# Patient Record
Sex: MALE | Race: WHITE | NOT HISPANIC OR LATINO | Employment: FULL TIME | URBAN - METROPOLITAN AREA
[De-identification: names, ages, dates, MRNs, and addresses within clinical notes are randomized per-mention and may not be internally consistent; named-entity substitution may affect disease eponyms.]

---

## 2021-02-04 ENCOUNTER — OCCUPATIONAL HEALTH (OUTPATIENT)
Dept: URGENT CARE | Facility: CLINIC | Age: 21
End: 2021-02-04

## 2021-02-04 DIAGNOSIS — Z02.89 ENCOUNTER FOR EXAMINATION REQUIRED BY DEPARTMENT OF TRANSPORTATION (DOT): Primary | ICD-10-CM

## 2021-02-04 PROCEDURE — 99499 UNLISTED E&M SERVICE: CPT | Mod: S$GLB,,, | Performed by: NURSE PRACTITIONER

## 2021-02-04 PROCEDURE — 80306 DRUG TEST PRSMV INSTRMNT: CPT | Mod: S$GLB,,, | Performed by: NURSE PRACTITIONER

## 2021-02-04 PROCEDURE — 99499 PHYSICAL, RECERT DOT/CDL: ICD-10-PCS | Mod: S$GLB,,, | Performed by: NURSE PRACTITIONER

## 2021-02-04 PROCEDURE — 80306 OOH DOT DRUG SCREEN: ICD-10-PCS | Mod: S$GLB,,, | Performed by: NURSE PRACTITIONER

## 2022-02-14 ENCOUNTER — OCCUPATIONAL HEALTH (OUTPATIENT)
Dept: URGENT CARE | Facility: CLINIC | Age: 22
End: 2022-02-14
Payer: COMMERCIAL

## 2022-02-14 DIAGNOSIS — Z02.89 ENCOUNTER FOR EXAMINATION REQUIRED BY DEPARTMENT OF TRANSPORTATION (DOT): ICD-10-CM

## 2022-02-14 DIAGNOSIS — Z00.00 ENCOUNTER FOR PHYSICAL EXAMINATION: Primary | ICD-10-CM

## 2022-02-14 PROCEDURE — 99499 DOT PHYSICAL: ICD-10-PCS | Mod: S$GLB,,, | Performed by: PREVENTIVE MEDICINE

## 2022-02-14 PROCEDURE — 99499 UNLISTED E&M SERVICE: CPT | Mod: S$GLB,,, | Performed by: PREVENTIVE MEDICINE

## 2022-02-14 PROCEDURE — 99080 OSHA QUESTIONNAIRE: ICD-10-PCS | Mod: S$GLB,,, | Performed by: PREVENTIVE MEDICINE

## 2022-02-14 PROCEDURE — 80306 OOH DOT DRUG SCREEN: ICD-10-PCS | Mod: S$GLB,,, | Performed by: PREVENTIVE MEDICINE

## 2022-02-14 PROCEDURE — 99080 SPECIAL REPORTS OR FORMS: CPT | Mod: S$GLB,,, | Performed by: PREVENTIVE MEDICINE

## 2022-02-14 PROCEDURE — 80306 DRUG TEST PRSMV INSTRMNT: CPT | Mod: S$GLB,,, | Performed by: PREVENTIVE MEDICINE

## 2022-02-14 PROCEDURE — 94010 PULMONARY FUNCTION SCREENING (OCC MED PHYSICALS): ICD-10-PCS | Mod: S$GLB,,, | Performed by: PREVENTIVE MEDICINE

## 2022-02-14 PROCEDURE — 94010 BREATHING CAPACITY TEST: CPT | Mod: S$GLB,,, | Performed by: PREVENTIVE MEDICINE

## 2022-02-14 PROCEDURE — 99002 MASK FIT-QUANTITATIVE: ICD-10-PCS | Mod: S$GLB,,, | Performed by: PREVENTIVE MEDICINE

## 2022-02-14 PROCEDURE — 99002 DEVICE HANDLING PHYS/QHP: CPT | Mod: S$GLB,,, | Performed by: PREVENTIVE MEDICINE

## 2022-02-20 ENCOUNTER — HOSPITAL ENCOUNTER (EMERGENCY)
Facility: HOSPITAL | Age: 22
Discharge: HOME | End: 2022-02-20
Attending: EMERGENCY MEDICINE
Payer: COMMERCIAL

## 2022-02-20 VITALS
DIASTOLIC BLOOD PRESSURE: 71 MMHG | BODY MASS INDEX: 23.06 KG/M2 | SYSTOLIC BLOOD PRESSURE: 133 MMHG | WEIGHT: 170 LBS | OXYGEN SATURATION: 98 % | HEART RATE: 95 BPM | TEMPERATURE: 99 F | RESPIRATION RATE: 16 BRPM

## 2022-02-20 DIAGNOSIS — R06.02 SHORTNESS OF BREATH: ICD-10-CM

## 2022-02-20 DIAGNOSIS — R07.9 CHEST PAIN: ICD-10-CM

## 2022-02-20 LAB
ALBUMIN SERPL BCP-MCNC: 4.6 G/DL (ref 3.5–5.2)
ALP SERPL-CCNC: 53 U/L (ref 55–135)
ALT SERPL W/O P-5'-P-CCNC: 28 U/L (ref 10–44)
ANION GAP SERPL CALC-SCNC: 12 MMOL/L (ref 8–16)
AST SERPL-CCNC: 22 U/L (ref 10–40)
BASOPHILS # BLD AUTO: 0.08 K/UL (ref 0–0.2)
BASOPHILS NFR BLD: 0.9 % (ref 0–1.9)
BILIRUB SERPL-MCNC: 0.5 MG/DL (ref 0.1–1)
BUN SERPL-MCNC: 13 MG/DL (ref 6–20)
CALCIUM SERPL-MCNC: 9.9 MG/DL (ref 8.7–10.5)
CHLORIDE SERPL-SCNC: 101 MMOL/L (ref 95–110)
CO2 SERPL-SCNC: 25 MMOL/L (ref 23–29)
CREAT SERPL-MCNC: 1 MG/DL (ref 0.5–1.4)
DIFFERENTIAL METHOD: ABNORMAL
EOSINOPHIL # BLD AUTO: 0.1 K/UL (ref 0–0.5)
EOSINOPHIL NFR BLD: 1.3 % (ref 0–8)
ERYTHROCYTE [DISTWIDTH] IN BLOOD BY AUTOMATED COUNT: 12 % (ref 11.5–14.5)
EST. GFR  (AFRICAN AMERICAN): >60 ML/MIN/1.73 M^2
EST. GFR  (NON AFRICAN AMERICAN): >60 ML/MIN/1.73 M^2
GLUCOSE SERPL-MCNC: 108 MG/DL (ref 70–110)
HCT VFR BLD AUTO: 47.1 % (ref 40–54)
HGB BLD-MCNC: 16 G/DL (ref 14–18)
IMM GRANULOCYTES # BLD AUTO: 0.03 K/UL (ref 0–0.04)
IMM GRANULOCYTES NFR BLD AUTO: 0.3 % (ref 0–0.5)
LYMPHOCYTES # BLD AUTO: 1.5 K/UL (ref 1–4.8)
LYMPHOCYTES NFR BLD: 15.9 % (ref 18–48)
MCH RBC QN AUTO: 31.9 PG (ref 27–31)
MCHC RBC AUTO-ENTMCNC: 34 G/DL (ref 32–36)
MCV RBC AUTO: 94 FL (ref 82–98)
MONOCYTES # BLD AUTO: 0.9 K/UL (ref 0.3–1)
MONOCYTES NFR BLD: 9.4 % (ref 4–15)
NEUTROPHILS # BLD AUTO: 6.8 K/UL (ref 1.8–7.7)
NEUTROPHILS NFR BLD: 72.2 % (ref 38–73)
NRBC BLD-RTO: 0 /100 WBC
PLATELET # BLD AUTO: 283 K/UL (ref 150–450)
PMV BLD AUTO: 10.7 FL (ref 9.2–12.9)
POTASSIUM SERPL-SCNC: 3.9 MMOL/L (ref 3.5–5.1)
PROT SERPL-MCNC: 7.5 G/DL (ref 6–8.4)
RBC # BLD AUTO: 5.02 M/UL (ref 4.6–6.2)
SODIUM SERPL-SCNC: 138 MMOL/L (ref 136–145)
TSH SERPL DL<=0.005 MIU/L-ACNC: 1.91 UIU/ML (ref 0.4–4)
WBC # BLD AUTO: 9.39 K/UL (ref 3.9–12.7)

## 2022-02-20 PROCEDURE — 99285 EMERGENCY DEPT VISIT HI MDM: CPT | Mod: 25

## 2022-02-20 PROCEDURE — 93005 ELECTROCARDIOGRAM TRACING: CPT

## 2022-02-20 PROCEDURE — 80053 COMPREHEN METABOLIC PANEL: CPT | Performed by: EMERGENCY MEDICINE

## 2022-02-20 PROCEDURE — 99285 PR EMERGENCY DEPT VISIT,LEVEL V: ICD-10-PCS | Mod: ,,, | Performed by: EMERGENCY MEDICINE

## 2022-02-20 PROCEDURE — 99285 EMERGENCY DEPT VISIT HI MDM: CPT | Mod: ,,, | Performed by: EMERGENCY MEDICINE

## 2022-02-20 PROCEDURE — 87389 HIV-1 AG W/HIV-1&-2 AB AG IA: CPT | Performed by: EMERGENCY MEDICINE

## 2022-02-20 PROCEDURE — 84443 ASSAY THYROID STIM HORMONE: CPT | Performed by: EMERGENCY MEDICINE

## 2022-02-20 PROCEDURE — 93010 ELECTROCARDIOGRAM REPORT: CPT | Mod: ,,, | Performed by: INTERNAL MEDICINE

## 2022-02-20 PROCEDURE — 86803 HEPATITIS C AB TEST: CPT | Performed by: EMERGENCY MEDICINE

## 2022-02-20 PROCEDURE — 25000003 PHARM REV CODE 250: Performed by: EMERGENCY MEDICINE

## 2022-02-20 PROCEDURE — 85025 COMPLETE CBC W/AUTO DIFF WBC: CPT | Performed by: EMERGENCY MEDICINE

## 2022-02-20 PROCEDURE — 93010 EKG 12-LEAD: ICD-10-PCS | Mod: ,,, | Performed by: INTERNAL MEDICINE

## 2022-02-20 RX ORDER — HYDROXYZINE PAMOATE 25 MG/1
25 CAPSULE ORAL
Status: COMPLETED | OUTPATIENT
Start: 2022-02-20 | End: 2022-02-20

## 2022-02-20 RX ORDER — HYDROXYZINE HYDROCHLORIDE 25 MG/1
25 TABLET, FILM COATED ORAL 3 TIMES DAILY PRN
Qty: 12 TABLET | Refills: 0 | Status: SHIPPED | OUTPATIENT
Start: 2022-02-20 | End: 2022-07-06 | Stop reason: ALTCHOICE

## 2022-02-20 RX ADMIN — HYDROXYZINE PAMOATE 25 MG: 25 CAPSULE ORAL at 10:02

## 2022-02-21 NOTE — ED TRIAGE NOTES
"Pt arrives to ED with complaint of midsternal, nonradiating chest "fullness" and sob. States symptoms started 1 month ago, but worsened tonight, states difficulty taking deep breath in. Denies n/v  "

## 2022-02-21 NOTE — ED PROVIDER NOTES
"Encounter Date: 2/20/2022    SCRIBE #1 NOTE: I, Deepti Becky, am scribing for, and in the presence of,  Birttani Solano MD. I have scribed the following portions of the note - Other sections scribed: HPI, ROS, PE.       History     Chief Complaint   Patient presents with    Chest Pain     Pt c/o chest heaviness and SOB that have been persistent for the past few months, but has gotten worse tonight. Took a couple puffs of his father's inhaler to see if it would help with no relief.      HPI      Time patient was seen by the provider: 8:35 PM    The patient is a 21 y.o. male with no significant medical history who presents to the ED with a complaint of chest pain. Patient reports that his pain has been on going on for a couple of months but has worsened tonight. He describes it as, "discomforting and fullness," to the chest. He notes a constant feeling of needing to stretch and just feels off. Patient adds that when he inhales and exhales it is hard for him to initiate the next breath. Patient reports that when he sleeps he sometimes has to catch this breath in his sleep but denies the feeling of sleep apnea. Does not sleep next to anyone. He reports that he works at [employer] but does not come into contact with the chemicals used at this job. His  believes that he could be having panics attacks but is unsure. Patient notes being uncomfortable and weakness to the entire body when this happens. He denies diet changes, fatigue, or LOC when he experiences his episodes. He was seen at an Urgent care couple months ago due to his concerns but his exams came back normal. Patient also denies drinking caffeine or energy drinks. He adds that he has stopped using tobacco x 1 month and social ETOH usage.      The history was provided by Patient and , medical records.        Review of patient's allergies indicates:  No Known Allergies  History reviewed. No pertinent past medical history.  Past Surgical " "History:   Procedure Laterality Date    ORIF FOREARM FRACTURE Right      History reviewed. No pertinent family history.  Social History     Tobacco Use    Smoking status: Current Some Day Smoker     Types: Vaping with nicotine    Smokeless tobacco: Never Used    Tobacco comment: quit a "few days ago."   Substance Use Topics    Alcohol use: Yes     Comment: social    Drug use: Yes     Types: Marijuana     Comment: occasional     Review of Systems  General: No fever.  No chills.  Eyes: No visual changes.  Head: No headache.    Integument: No rashes or lesions.  Chest: Shortness of breath.  Cardiovascular: No chest pain.  Abdomen: No abdominal pain.  No nausea or vomiting.  Urinary: No abnormal urination.  Neurologic: No focal weakness.  No numbness.  Hematologic: No easy bruising.  Endocrine: No excessive thirst or urination.    Physical Exam     Initial Vitals [02/20/22 2016]   BP Pulse Resp Temp SpO2   (!) 171/91 (!) 123 18 98.6 °F (37 °C) 98 %      MAP       --         Physical Exam  Appearance: No acute distress.  HEENT: Normocephalic. Atraumatic. No conjunctival injection. EOMI. PERRL.    Neck: No JVD. Neck supple.    Chest: Non-tender. No respiratory distress. Lungs CTAB  Cardiovascular: Tachycardic. Regular rhythm. +2 radial pulses bilaterally. No LE edema  Abdomen: Not distended   Musculoskeletal: Good range of motion all joints. No deformities.    Neurologic: Alert and oriented x 3.  Equal strength in upper and lower extremities bilaterally. Normal sensation. No facial droop. Normal speech.    Psych:  Appropriate, conversant. Mildly anxious   Integumentary: No rashes seen.  Good turgor.  No abrasions.  No ecchymoses.    ED Course   Procedures  Labs Reviewed   CBC W/ AUTO DIFFERENTIAL - Abnormal; Notable for the following components:       Result Value    MCH 31.9 (*)     Lymph % 15.9 (*)     All other components within normal limits    Narrative:     Release to patient->Immediate   COMPREHENSIVE " METABOLIC PANEL - Abnormal; Notable for the following components:    Alkaline Phosphatase 53 (*)     All other components within normal limits    Narrative:     Release to patient->Immediate   TSH    Narrative:     Release to patient->Immediate   HIV 1 / 2 ANTIBODY   HEPATITIS C ANTIBODY        ECG Results          EKG 12-lead (In process)  Result time 02/20/22 20:48:47    In process by Interface, Lab In [facility] (02/20/22 20:48:47)                 Narrative:    Test Reason : R07.9,    Vent. Rate : 121 BPM     Atrial Rate : 121 BPM     P-R Int : 124 ms          QRS Dur : 076 ms      QT Int : 288 ms       P-R-T Axes : 080 048 023 degrees     QTc Int : 408 ms    Sinus tachycardia  Possible Left atrial enlargement  Borderline Abnormal ECG  When compared with ECG of 09-NOV-2021 10:16,  No significant change was found    Referred By: [email or web address]   SELF           Confirmed By:                             Imaging Results          X-Ray Chest PA And Lateral (Final result)  Result time 02/20/22 21:11:40    Final result by Luis E Li MD (02/20/22 21:11:40)                 Impression:      No obvious evidence of an acute pulmonary process.      Electronically signed by: Luis E Li  Date:    02/20/2022  Time:    21:11             Narrative:    EXAMINATION:  XR CHEST PA AND LATERAL    CLINICAL HISTORY:  Shortness of breath    TECHNIQUE:  PA and lateral views of the chest were performed.    COMPARISON:  November 9, 2021    FINDINGS:  Multiple views of the chest reveals that the lungs are well aerated.  No indication of a consolidative process or pleural effusion.  No pulmonary nodules.  The heart is normal in size and contour.  The lateral projection is unremarkable.                                 Medications   hydrOXYzine pamoate capsule 25 mg (25 mg Oral Given 2/20/22 2241)     Medical Decision Making:   History:   Old Medical Records: I decided to obtain old medical records.  Independently Interpreted Test(s):   I  have ordered and independently interpreted X-rays - see prior notes.  I have ordered and independently interpreted EKG Reading(s) - see prior notes  Clinical Tests:   Lab Tests: Ordered and Reviewed  Radiological Study: Ordered and Reviewed  Medical Tests: Ordered and Reviewed          Scribe Attestation:   Scribe #1: I performed the above scribed service and the documentation accurately describes the services I performed. I attest to the accuracy of the note.                 Clinical Impression:   Final diagnoses:  [R07.9] Chest pain  [R06.02] Shortness of breath         20 yo male presents for difficulty breathing. On arrival, pt tachycardic, hypertensive, otherwise VSS< afeb. Pt visibly anxious on exam, otherwise benign. No e/o CHF or VTE. Lab reassuring. EKG sinus tachy, normal intervals, no STEMI. CXR without acute abn. Reassessed, pt well-appearing, NAD, nontoxic.     Discussed results, diagnosis, and treatment plan with pt; advised close follow-up with PCP. Reviewed strict return precautions. Pt confirms understanding and ability to comply.           ED Disposition Condition    Discharge Stable        ED Prescriptions     Medication Sig Dispense Start Date End Date Auth. Provider    hydrOXYzine HCL (ATARAX) 25 MG tablet Take 1 tablet (25 mg total) by mouth 3 (three) times daily as needed for Anxiety. 12 tablet 2/20/2022  Brittani Sloano MD        Follow-up Information     Follow up With Specialties Details Why Contact Info Additional Information    [facility] Internal Medicine Schedule an appointment as soon as possible for a visit   [address]  [facility] [address] [phone] [facility] Please park in surface lot and check in at central registration desk           Brittani Solano MD  02/20/22 0052

## 2022-02-22 LAB
HCV AB SERPL QL IA: NEGATIVE
HIV 1+2 AB+HIV1 P24 AG SERPL QL IA: NEGATIVE

## 2022-07-06 ENCOUNTER — OFFICE VISIT (OUTPATIENT)
Dept: INTERNAL MEDICINE | Facility: CLINIC | Age: 22
End: 2022-07-06
Payer: COMMERCIAL

## 2022-07-06 VITALS
HEART RATE: 84 BPM | SYSTOLIC BLOOD PRESSURE: 135 MMHG | HEIGHT: 72 IN | DIASTOLIC BLOOD PRESSURE: 75 MMHG | WEIGHT: 207.44 LBS | BODY MASS INDEX: 28.1 KG/M2 | OXYGEN SATURATION: 98 %

## 2022-07-06 DIAGNOSIS — F41.1 GAD (GENERALIZED ANXIETY DISORDER): ICD-10-CM

## 2022-07-06 DIAGNOSIS — F90.9 ATTENTION DEFICIT HYPERACTIVITY DISORDER (ADHD), UNSPECIFIED ADHD TYPE: ICD-10-CM

## 2022-07-06 DIAGNOSIS — Z76.89 ENCOUNTER TO ESTABLISH CARE WITH NEW DOCTOR: Primary | ICD-10-CM

## 2022-07-06 DIAGNOSIS — Z72.0 NICOTINE USE: ICD-10-CM

## 2022-07-06 PROCEDURE — 99204 OFFICE O/P NEW MOD 45 MIN: CPT | Mod: S$GLB,,, | Performed by: INTERNAL MEDICINE

## 2022-07-06 PROCEDURE — 99204 PR OFFICE/OUTPT VISIT, NEW, LEVL IV, 45-59 MIN: ICD-10-PCS | Mod: S$GLB,,, | Performed by: INTERNAL MEDICINE

## 2022-07-06 PROCEDURE — 99999 PR PBB SHADOW E&M-EST. PATIENT-LVL IV: ICD-10-PCS | Mod: PBBFAC,,, | Performed by: INTERNAL MEDICINE

## 2022-07-06 PROCEDURE — 99999 PR PBB SHADOW E&M-EST. PATIENT-LVL IV: CPT | Mod: PBBFAC,,, | Performed by: INTERNAL MEDICINE

## 2022-07-06 RX ORDER — ESCITALOPRAM OXALATE 10 MG/1
TABLET ORAL
Qty: 30 TABLET | Refills: 1 | Status: SHIPPED | OUTPATIENT
Start: 2022-07-06 | End: 2022-08-18 | Stop reason: SDUPTHER

## 2022-07-06 NOTE — PROGRESS NOTES
INTERNAL MEDICINE INITIAL VISIT NOTE      CHIEF COMPLAINT     Chief Complaint   Patient presents with    Anxiety    Breathing Problem       HPI     Michael Diaz is a 21 y.o. male with ADHD, here today to establish care. No PCP listed.     Diagnosed in elementary school with ADHD - not on medication presently.     Reports multiple ER visits for chest pain and shortness of breath since fall 2021. Thought to be anxiety related. Prescribed hydroxyzine PRN. Helpful as it aided in sleep.     Episodes of severe anxiety often start while driving - has to pull over because feels like about to pass out. Begins with shallow breathing. Also occurs prior to falling asleep - gasps for breath, eventual improvement. Overall has increased in frequency over past 1 year, now almost daily. Not bothersome when occupied at work or resting at home.     Past Medical History:  Past Medical History:   Diagnosis Date    ADHD (attention deficit hyperactivity disorder)        Review of Systems:  Review of Systems   Constitutional: Negative for chills and fever.   HENT: Negative for congestion.    Respiratory: Negative for cough and shortness of breath.    Cardiovascular: Negative for chest pain.   Gastrointestinal: Negative for constipation, nausea and vomiting.   Genitourinary: Negative for hematuria and urgency.   Musculoskeletal: Negative for falls.   Skin: Negative for rash.   Neurological: Negative for dizziness and loss of consciousness.   Psychiatric/Behavioral: The patient is nervous/anxious.        Health Maintenance:   Immunizations:   Influenza - fall 2022  Tdap - next visit  Covid 19 - next visit  HPV - complete  Prevnar rec at 65  Shingrix rec at 50    Cancer Screening:  PAP: n/a  Mammogram:  n/a  Colonoscopy:  n/a  DEXA:  n/a      PHYSICAL EXAM     /75 (BP Location: Right arm, Patient Position: Sitting, BP Method: Medium (Manual))   Pulse 84   Ht 6' (1.829 m)   Wt 94.1 kg (207 lb 7.3 oz)   SpO2 98%   BMI 28.14 kg/m²      GEN - A+OX4, NAD   HEENT - PERRL, EOMI,   Neck - No thyromegaly or thyroid masses felt.  No cervical lymphadenopathy appreciated.  CV - RRR, no m/r/g  Chest - CTAB, no wheezing, crackles, or rhonchi  Abd - S/NT/ND/+BS.   Ext - 2+BDP. No C/C/E.  Skin - Normal color and texture, no rash, no skin lesions.    ASSESSMENT/PLAN     Michael Diaz is a 21 y.o. male with conditions as above.     Encounter to establish care with new doctor  Prior notes/labs/imaging reviewed including recent ER visits    Attention deficit hyperactivity disorder (ADHD), unspecified ADHD type  Stable, off medications    KLAUDIA (generalized anxiety disorder)  Discussed behavioral therapy, pharmacotherapy; counseled on proper sleep hygiene, increasing physical activity, mindfulness/yoga practices  Start low dose Lexapro and refer for counseling; advised on possible side effects  Provided with educational handouts  -     EScitalopram oxalate (LEXAPRO) 10 MG tablet; Take 5mg (1/2 tablet) by mouth daily. If tolerated, increase to 10mg (1 tablet) by mouth daily thereafter.  Dispense: 30 tablet; Refill: 1  -     Ambulatory referral/consult to Primary Care Behavioral Health (Non-Opioids); Future; Expected date: 07/13/2022    Nicotine use  Contemplative    HM as above    RTC in 6 weeks, sooner if needed and depending on labs.    Jo Ann Cooper MD  Department of Internal Medicine - [facility]  07/06/2022

## 2022-07-08 ENCOUNTER — PATIENT MESSAGE (OUTPATIENT)
Dept: BEHAVIORAL HEALTH | Facility: CLINIC | Age: 22
End: 2022-07-08
Payer: COMMERCIAL

## 2022-07-08 ENCOUNTER — TELEPHONE (OUTPATIENT)
Dept: BEHAVIORAL HEALTH | Facility: CLINIC | Age: 22
End: 2022-07-08
Payer: COMMERCIAL

## 2022-07-25 ENCOUNTER — TELEPHONE (OUTPATIENT)
Dept: BEHAVIORAL HEALTH | Facility: CLINIC | Age: 22
End: 2022-07-25
Payer: COMMERCIAL

## 2022-07-26 NOTE — PROGRESS NOTES
Patient was referred to the [facility] Non Opioid program by PCP.  CHW tired to reach out to patient a total of three times.  Patient referral was removed from the [facility] program.  CHW sent follow up message to patient's PCP via [employer].

## 2022-08-18 ENCOUNTER — OFFICE VISIT (OUTPATIENT)
Dept: INTERNAL MEDICINE | Facility: CLINIC | Age: 22
End: 2022-08-18
Payer: COMMERCIAL

## 2022-08-18 VITALS
BODY MASS INDEX: 28.7 KG/M2 | DIASTOLIC BLOOD PRESSURE: 78 MMHG | SYSTOLIC BLOOD PRESSURE: 140 MMHG | OXYGEN SATURATION: 97 % | WEIGHT: 211.88 LBS | HEIGHT: 72 IN | HEART RATE: 93 BPM

## 2022-08-18 DIAGNOSIS — R03.0 ELEVATED BLOOD PRESSURE READING IN OFFICE WITHOUT DIAGNOSIS OF HYPERTENSION: ICD-10-CM

## 2022-08-18 DIAGNOSIS — F41.1 GAD (GENERALIZED ANXIETY DISORDER): Primary | ICD-10-CM

## 2022-08-18 DIAGNOSIS — G47.9 SLEEPING DIFFICULTIES: ICD-10-CM

## 2022-08-18 PROCEDURE — 99999 PR PBB SHADOW E&M-EST. PATIENT-LVL IV: CPT | Mod: PBBFAC,,, | Performed by: INTERNAL MEDICINE

## 2022-08-18 PROCEDURE — 99999 PR PBB SHADOW E&M-EST. PATIENT-LVL IV: ICD-10-PCS | Mod: PBBFAC,,, | Performed by: INTERNAL MEDICINE

## 2022-08-18 PROCEDURE — 99214 PR OFFICE/OUTPT VISIT, EST, LEVL IV, 30-39 MIN: ICD-10-PCS | Mod: S$GLB,,, | Performed by: INTERNAL MEDICINE

## 2022-08-18 PROCEDURE — 99214 OFFICE O/P EST MOD 30 MIN: CPT | Mod: S$GLB,,, | Performed by: INTERNAL MEDICINE

## 2022-08-18 RX ORDER — ESCITALOPRAM OXALATE 10 MG/1
10 TABLET ORAL DAILY
Qty: 30 TABLET | Refills: 11 | Status: SHIPPED | OUTPATIENT
Start: 2022-08-18 | End: 2022-11-11 | Stop reason: SDUPTHER

## 2022-08-18 RX ORDER — HYDROXYZINE HYDROCHLORIDE 10 MG/1
10 TABLET, FILM COATED ORAL NIGHTLY PRN
Qty: 30 TABLET | Refills: 5 | Status: ON HOLD | OUTPATIENT
Start: 2022-08-18 | End: 2023-08-02 | Stop reason: HOSPADM

## 2022-08-18 NOTE — PROGRESS NOTES
INTERNAL MEDICINE ESTABLISHED PATIENT VISIT NOTE    Subjective:     Chief Complaint: Follow-up       Patient ID: Michael Diaz is a 22 y.o. male with ADHD, KLAUDIA, last seen by me 7/2022, here today for follow-up.    Started on Lexapro and referred to Behavioral Health for management of anxiety at initial visit. Unaware of attempts by Behavioral Health to contact pt. Only 1 panic attack since starting Lexapro. Overall, anxiety more controlled. Continuing to have issues with sleeping; feels short of breath suddenly while trying to sleep or asleep and then wakes up. Does occasionally nap during day.     Past Medical History:  Past Medical History:   Diagnosis Date    ADHD (attention deficit hyperactivity disorder)           Review of Systems:  Review of Systems   Constitutional: Negative for chills and fever.   HENT: Negative for congestion.    Respiratory: Negative for cough and shortness of breath.    Cardiovascular: Negative for chest pain.   Gastrointestinal: Negative for constipation, nausea and vomiting.   Genitourinary: Negative for hematuria and urgency.   Musculoskeletal: Negative for falls.   Skin: Negative for rash.   Neurological: Negative for dizziness and loss of consciousness.       Health Maintenance:   Immunizations:   Influenza - fall 2022  Tdap - next visit  Covid 19 - next visit  HPV - complete  Prevnar rec at 65  Shingrix rec at 50     Cancer Screening:  PAP: n/a  Mammogram:  n/a  Colonoscopy:  n/a  DEXA:  n/a    Objective:   BP (!) 140/78 (BP Location: Left arm, Patient Position: Sitting)   Pulse 93   Ht 6' (1.829 m)   Wt 96.1 kg (211 lb 13.8 oz)   SpO2 97%   BMI 28.73 kg/m²        Labs:       Assessment/Plan     KLAUDIA (generalized anxiety disorder)  Improving, continue Lexapro 10; refer to Behavioral Health again as requested, pt aware may be contacted via phone/portal message   -     EScitalopram oxalate (LEXAPRO) 10 MG tablet; Take 1 tablet (10 mg total) by mouth once daily.  Dispense: 30  tablet; Refill: 11  -     Ambulatory referral/consult to Primary Care Behavioral Health (Non-Opioids); Future; Expected date: 08/25/2022    Sleeping difficulties  Multifactorial etiology, likely mood disorder and sleep hygiene mediated  Advised on appropriate sleep hygiene measures; trial of low dose Atarax  To notify clinic of continued/worsening sleep issues   -     hydrOXYzine HCL (ATARAX) 10 MG Tab; Take 1 tablet (10 mg total) by mouth nightly as needed (insomnia).  Dispense: 30 tablet; Refill: 5    Elevated blood pressure reading in office without diagnosis of hypertension  Advised to purchase BP cuff and keep log of daily BP readings; to send log via portal  Low Na diet, physical activity    HM as above    RTC in 4 mo, sooner if needed.    Jo Ann Cooper MD  Department of Internal Medicine - [facility]  08/20/2022

## 2022-08-18 NOTE — PATIENT INSTRUCTIONS
Purchase BP cuff and check BP at least 3 times a week at various times of day/evening. Send BP log via portal in 3-4 weeks.

## 2022-08-23 ENCOUNTER — TELEPHONE (OUTPATIENT)
Dept: BEHAVIORAL HEALTH | Facility: CLINIC | Age: 22
End: 2022-08-23
Payer: COMMERCIAL

## 2022-08-23 NOTE — PROGRESS NOTES
Behavioral Health Community Health Worker  Initial Assessment  Completed by:  Landry Wheat    Date:  8/23/2022    Patient Enrollment in Behavioral Health Program:  · Patient verbalized understanding of Behavioral Health Integration services to include:  · Patient understands that CHW, LCSW, PharmD and consulting Psychiatrist are members of the care team working collaboratively with his/her primary care provider: Yes  · Patient understands that activation of their [facility] patient portal account is required for accessing the full scope of team services: Yes  · Patient understands that some counseling sessions may occur via video: Yes  · Clinic visits with the psychiatrist may be subject to a co-pay based on your insurance: Yes  · Patient consents to enroll in BHI program: Yes    Assessments     Single Item Health Literacy Scale:  · How often do you need to have someone help you read instructions, pamphlets or other written material from your doctor or pharmacy?: Never    Promis 10:  · Promis 10 Responses  · In general, would you say your health is: Very good  · In general, would you say your quality of life is: Very good  · In general, how would you rate your physical health?: Very good  · In general, how would you rate your mental health, including your mood and your ability to think?: Good  · In general, how would you rate your satisfaction with your social activities and relationships?: Very good  · In general, please rate how well you carry out your usual social activities and roles. (This includes activities at home, at work and in your community, and responsibilities as a parent, child, spouse, employee, friend, etc.): Excellent  · To what extent are you able to carry out your everyday physical activities such as walking, climbing stairs, carrying groceries, or moving a chair? : Completely  · How often have you been bothered by emotional problems such as feeling anxious, depressed or irritable?:  "Sometimes  · In the past 7 days, how would you rate your fatigue on average?: Moderate  · In the past 7 days, on a scale of 0 to 10 (where 0 is no pain and 10 is the worst pain imaginable) how would you rate your pain on average?: 0  · Global Physical Health: 12  · Global Mental health Score: 14    Depression PHQ:  PHQ9 8/23/2022   Total Score 3         Generalized Anxiety Disorder 7-Item Scale:  GAD7 8/23/2022   1. Feeling nervous, anxious, or on edge? 3   2. Not being able to stop or control worrying? 0   3. Worrying too much about different things? 0   4. Trouble relaxing? 0   5. Being so restless that it is hard to sit still? 2   6. Becoming easily annoyed or irritable? 0   7. Feeling afraid as if something awful might happen? 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        Call Summary     Patient was referred to the BHI (Non-opioid) program by Primary Care Provider, Dr. Cooper CHW contacted Michael Diaz   Patient scored "3" on the PHQ9 and "5" on the KLAUDIA 7. Based on these scores patient is eligible for the Behavioral health Integration (Non-opioid) Program. CHW completed the intake and scheduled an appointment for patient with Gisele Michel LCSW, on 9/20/22       "

## 2022-10-05 NOTE — PROGRESS NOTES
Primary Care Behavioral Health Integration: Initial  Date:  10/13/2022  Referral Source:  Isaiah Cooper MD  Type of Visit:  In person  Length of Appointment: 45  .  History of Present Illness:  Michael Diaz, a 22 y.o. male referred by Isaiah Cooper MD.  Patient was seen, examined and chart was reviewed.    Met with patient.   Michael Diaz is a 21 y.o. male with ADHD, here today to establish care. No PCP listed.          About a year and a half ago, he noticed trouble breathing that was really bad while driving and trouble sleeping because of it.  He had a couple of panic attacks.  He went to the ED for one. He started to realize he might have anxiety.  Pt's pcp put him on lexapro.  It has helped with anxiety while driving.  He has trouble falling asleep.  He will start feeling out of breath.  He will be falling asleep and will jolt awake when falling asleep because he can't breathe.  He has panic attacks twice a week. He reports he tries not to focus on the panic attack.  He doesn't like to focus on his breathing because it makes him more anxious.  He tries to distract self.  He finds touching his neck helps.  Stretching his back helps.  He doesn't get light headed anymore.  He feels lexapro is helping a good bit.  He isn't sure if he wants to increase his lexapro unless it helps with sleep more.    He will watch [employer] on his phone before sleep.  Takes 2-3 hours to go to sleep.  He tries to get 6-7 hours of sleep a night.    Was in [employer] for a little bit. 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   Doesn't know if family hx.   Pt has really good friends.  He is into the EDM scene. Lots of supportive friends.  Recreation: music, video games, likes food, raves  Relax: youtube, netflix,   He reports he worries a lot but is getting better at that.  He will remove himself from the situation if he needs to.  Worry is focused on breathing and heart rate.   He  gets anxious falling asleep because of the difficulty breathing.    We discussed coming up with a mantra for sleep.  SW will send sleep meditations.       Current symptoms:  Depression: denies.  Anxiety: excessive worrying, restlessness, muscle tension, and panic attacks.  Sleep: difficulty falling asleep. Takes 2-3 hours to fall asleep. Gets 6-7 hours of sleep a night.   Ellyn:  denies.  Psychosis: denies .    PHQ9 8/23/2022   Total Score 3     GAD7 8/23/2022   1. Feeling nervous, anxious, or on edge? 3   2. Not being able to stop or control worrying? 0   3. Worrying too much about different things? 0   4. Trouble relaxing? 0   5. Being so restless that it is hard to sit still? 2   6. Becoming easily annoyed or irritable? 0   7. Feeling afraid as if something awful might happen? 0   KLAUDIA-7 Score 5       Risk assessment:  Patient reports no suicidal ideation  Patient reports no homicidal ideation  Patient reports no self-injurious behavior  Patient reports no violent behavior    Past Medical History:   Diagnosis Date    ADHD (attention deficit hyperactivity disorder)          Current Outpatient Medications:     EScitalopram oxalate (LEXAPRO) 10 MG tablet, Take 1 tablet (10 mg total) by mouth once daily., Disp: 30 tablet, Rfl: 11    hydrOXYzine HCL (ATARAX) 10 MG Tab, Take 1 tablet (10 mg total) by mouth nightly as needed (insomnia)., Disp: 30 tablet, Rfl: 5    Psychiatric History:  Psychiatric Diagnosis:  Pt is taking  hydroxyzine (Vistrail) 10mg qHS for Anxiety. Pt is taking escitalopram (Lexapro) 10mg once daily for Depression.   Rarely takes hydroxyzine.   They are not interested in medication changes.  Previous Medication Trials: Yes unsure of what he took for adhd  Previous Psychiatric Outpatient Treatment:  yes when a child for adhd   Previous Psychiatric Hospitalizations:  no  Previous Suicide Attempts:  no  History of Trauma:  no  Access to a Firearm:  no    Substance Use History:  Tobacco/Nicotine:  Yes -    vapes 1/2 a cartridge a day  Alcohol:     moderately 3 times a week. 5 white claws. It helps with sleep.   Illicit Substances:  used to smoke mj but makes him anxious so he stopped  Misuse of Prescription Medications:  denies  Caffeine: no it makes him more anxious     Mental Status Exam  General Appearance:  unremarkable, age appropriate   Speech: normal tone, normal rate, normal pitch, normal volume      Level of Cooperation: cooperative      Thought Processes: normal and logical   Mood: steady      Thought Content: normal, no suicidality, no homicidality, delusions, or paranoia   Affect: congruent and appropriate   Orientation: Oriented x3   Memory: recent >  intact, remote >  intact   Attention Span & Concentration: Not assessed   Fund of General Knowledge: Not assessed   Abstract Reasoning: Not assessed   Judgment & Insight: good     Language  intact       Impression:   My diagnostic impression is Anxiety disorders; generalized anxiety disorder [F41.1], as evidenced by .excessive worrying, restlessness, muscle tension, and panic attacks     Provisional Diagnosis:  1. KLAUDIA (generalized anxiety disorder)         Treatment Goals and Plan: Initial appointment focused on gathering history, identifying treatment goals and developing a treatment plan.      Anxiety: reducing negative automatic thoughts, reducing physical symptoms of anxiety, and reducing time spent worrying (<30 minutes/day)    Future treatment will utilize CBT, Mindfulness Techniques, Motivational Interviewing, Solution-focused Therapy, and Relaxation Techniques .      Return to Clinic: Follow up in about 3 weeks (around 10/27/2022).

## 2022-10-06 ENCOUNTER — OFFICE VISIT (OUTPATIENT)
Dept: BEHAVIORAL HEALTH | Facility: CLINIC | Age: 22
End: 2022-10-06
Payer: COMMERCIAL

## 2022-10-06 ENCOUNTER — PATIENT MESSAGE (OUTPATIENT)
Dept: BEHAVIORAL HEALTH | Facility: CLINIC | Age: 22
End: 2022-10-06
Payer: COMMERCIAL

## 2022-10-06 DIAGNOSIS — F41.1 GAD (GENERALIZED ANXIETY DISORDER): ICD-10-CM

## 2022-10-06 PROCEDURE — 99999 PR PBB SHADOW E&M-EST. PATIENT-LVL I: ICD-10-PCS | Mod: PBBFAC,,, | Performed by: SOCIAL WORKER

## 2022-10-06 PROCEDURE — 90791 PSYCH DIAGNOSTIC EVALUATION: CPT | Mod: S$GLB,,, | Performed by: SOCIAL WORKER

## 2022-10-06 PROCEDURE — 99999 PR PBB SHADOW E&M-EST. PATIENT-LVL I: CPT | Mod: PBBFAC,,, | Performed by: SOCIAL WORKER

## 2022-10-06 PROCEDURE — 90791 PR PSYCHIATRIC DIAGNOSTIC EVALUATION: ICD-10-PCS | Mod: S$GLB,,, | Performed by: SOCIAL WORKER

## 2022-10-11 ENCOUNTER — PATIENT MESSAGE (OUTPATIENT)
Dept: BEHAVIORAL HEALTH | Facility: CLINIC | Age: 22
End: 2022-10-11
Payer: COMMERCIAL

## 2022-11-07 ENCOUNTER — TELEPHONE (OUTPATIENT)
Dept: BEHAVIORAL HEALTH | Facility: CLINIC | Age: 22
End: 2022-11-07
Payer: COMMERCIAL

## 2022-11-10 ENCOUNTER — PATIENT MESSAGE (OUTPATIENT)
Dept: BEHAVIORAL HEALTH | Facility: CLINIC | Age: 22
End: 2022-11-10
Payer: COMMERCIAL

## 2022-11-10 ENCOUNTER — OFFICE VISIT (OUTPATIENT)
Dept: BEHAVIORAL HEALTH | Facility: CLINIC | Age: 22
End: 2022-11-10
Payer: COMMERCIAL

## 2022-11-10 DIAGNOSIS — F41.1 GAD (GENERALIZED ANXIETY DISORDER): Primary | ICD-10-CM

## 2022-11-10 PROCEDURE — 90834 PSYTX W PT 45 MINUTES: CPT | Mod: S$GLB,,, | Performed by: SOCIAL WORKER

## 2022-11-10 PROCEDURE — 90834 PR PSYCHOTHERAPY W/PATIENT, 45 MIN: ICD-10-PCS | Mod: S$GLB,,, | Performed by: SOCIAL WORKER

## 2022-11-10 NOTE — PROGRESS NOTES
"Individual Psychotherapy (LCSW/PhD)  Michael Diaz,  11/10/2022    Site:  [facility]         Therapeutic Intervention: Met with patient for individual psychotherapy.    Chief complaint/reason for encounter: anxiety     Interval history and content of current session: Pt feels anxiety is getting moderately better.  "It's less of an intrusion."  He feels most days are pretty even keeled.  He shares continued problems with sleep.  He shares he tried mantras and sleep meditations but they didn't work.  He is having trouble falling asleep and feels he can't breathe.  He will be trying to fall asleep but will gasp as he's falling asleep.  It doesn't cause addrenaline rush it used to cause.  It happens multiple times before he falls asleep.  He wakes feeling rested.  He denies snoring.  He isn't worrying as much about breathing during the day.  He hasn't had a panic attack in a week or two.  He found the sleep meditations made him sleepy but didn't help him fall asleep.  HE tried the meditations for 2 weeks but then stopped because it wasn't working.  His going to bed routine is watching youtube before bed.  He will sometimes fall asleep while watching youtube.  He falls asleep better with noise in the background.  IT usually takes about an hour to fall asleep. The difficulty falling asleep started when he was getting anxious.    He is looking into seeing a chiropractor to help with back and see if this helps with his bnreathing.  Not taking in caffeine.    He wants to get on a diet and start exercising.    Discussed sleep hygiene. Discussed sleep podcasts.    Discussed different sleep mantras.  He has been telling himself adriana as a mantra.  He likes doing the mantras and finds it helpful. Discussed saying "I'll sleep fine"  or "i'll fall asleep fine" as he's falling asleep as a mantra.  He is having more vivid dreams.   He is interested in increasing lexapro.  JADON will staff with Dr. Edwards      Treatment " plan:  Target symptoms: anxiety   Why chosen therapy is appropriate versus another modality: relevant to diagnosis, patient responds to this modality  Outcome monitoring methods: self-report, observation  Therapeutic intervention type: behavior modifying psychotherapy, supportive psychotherapy    Risk parameters:  Patient reports no suicidal ideation  Patient reports no homicidal ideation  Patient reports no self-injurious behavior  Patient reports no violent behavior    Verbal deficits: None    Patient's response to intervention:  The patient's response to intervention is accepting.    Progress toward goals and other mental status changes:  The patient's progress toward goals is good.    Diagnosis:     ICD-10-CM ICD-9-CM   1. KLAUDIA (generalized anxiety disorder)  F41.1 300.02       Plan: Pt plans to continue individual psychotherapy    Return to clinic: 3 weeks    Length of Service (minutes): 45

## 2022-11-10 NOTE — Clinical Note
Please see my addendum from today's I meeting.  If you agree with this plan, I will pend medication order in a separate refill encounter.  Please reach out to me with any questions.  Sahhnaz Edwards

## 2022-11-11 DIAGNOSIS — F41.1 GAD (GENERALIZED ANXIETY DISORDER): ICD-10-CM

## 2022-11-11 RX ORDER — ESCITALOPRAM OXALATE 20 MG/1
20 TABLET ORAL DAILY
Qty: 30 TABLET | Refills: 2 | Status: ON HOLD | OUTPATIENT
Start: 2022-11-11 | End: 2023-08-02 | Stop reason: HOSPADM

## 2022-11-11 NOTE — PROGRESS NOTES
Patient discussed during I meeting today.  Patient with poorly controlled anxiety.  He is taking Lexapro 10mg daily which is helping some.  He is interested in increasing dose to 20mg daily.  I will pend order for PCP if she agrees with plan.      Time: 8 minutes

## 2022-11-16 ENCOUNTER — PATIENT MESSAGE (OUTPATIENT)
Dept: BEHAVIORAL HEALTH | Facility: CLINIC | Age: 22
End: 2022-11-16
Payer: COMMERCIAL

## 2022-11-22 ENCOUNTER — TELEPHONE (OUTPATIENT)
Dept: BEHAVIORAL HEALTH | Facility: CLINIC | Age: 22
End: 2022-11-22
Payer: COMMERCIAL

## 2022-11-22 NOTE — PROGRESS NOTES
Behavioral Health Community Health Worker  Follow-Up  Completed by:  Landry Wheat    Date:  11/22/2022    Patient Enrollment in Behavioral Health Program:  Michael Diaz was enrolled in the Behavioral Health Program on 10/6/22    Assessments     Promis 10:  PROMIS-10 Questionnaire Scores 8/23/2022   Global Physical Health 12   Global Mental health Score 14       Depression PHQ:  PHQ9 11/22/2022   Total Score 9       Generalized Anxiety Disorder 7-Item Scale:  GAD7 11/22/2022   1. Feeling nervous, anxious, or on edge? 3   2. Not being able to stop or control worrying? 2   3. Worrying too much about different things? 0   4. Trouble relaxing? 2   5. Being so restless that it is hard to sit still? 1   6. Becoming easily annoyed or irritable? 0   7. Feeling afraid as if something awful might happen? 2   8. If you checked off any problems, how difficult have these problems made it for you to do your work, take care of things at home, or get along with other people? 1   KLAUDIA-7 Score 10       Patients' Global Impression of Change (PGIC) Scale:  Since beginning treatment at this clinic, how would you describe the change (if any) in ACTIVITY LIMITATIONS, SYMPTOMS, EMOTIONS, and OVERALL QUALITY OF LIFE, related to your painful condition?  No Value exists for the : OHS#53392      In a similar way, please check the number below that matches your degree of change since beginning care at this clinic (Much better (0) - Much Worse (10)): No Value exists for the : OHS#25996        Much Better                                     No Change                                    Much Worse                        -----------------------------------------------------------------------------                        0       1       2       3       4       5       6       7      8       9      10                     Call Summary     Assessment updated

## 2022-12-06 ENCOUNTER — PATIENT MESSAGE (OUTPATIENT)
Dept: BEHAVIORAL HEALTH | Facility: CLINIC | Age: 22
End: 2022-12-06
Payer: COMMERCIAL

## 2022-12-22 ENCOUNTER — PATIENT MESSAGE (OUTPATIENT)
Dept: BEHAVIORAL HEALTH | Facility: CLINIC | Age: 22
End: 2022-12-22
Payer: COMMERCIAL

## 2023-01-31 ENCOUNTER — OCCUPATIONAL HEALTH (OUTPATIENT)
Dept: URGENT CARE | Facility: CLINIC | Age: 23
End: 2023-01-31

## 2023-01-31 DIAGNOSIS — Z02.89 ENCOUNTER FOR EXAMINATION REQUIRED BY DEPARTMENT OF TRANSPORTATION (DOT): Primary | ICD-10-CM

## 2023-01-31 PROCEDURE — 80306 OOH DOT DRUG SCREEN: ICD-10-PCS | Mod: S$GLB,,, | Performed by: STUDENT IN AN ORGANIZED HEALTH CARE EDUCATION/TRAINING PROGRAM

## 2023-01-31 PROCEDURE — 99002 MASK FIT-QUANTITATIVE: ICD-10-PCS | Mod: S$GLB,,, | Performed by: STUDENT IN AN ORGANIZED HEALTH CARE EDUCATION/TRAINING PROGRAM

## 2023-01-31 PROCEDURE — 94010 BREATHING CAPACITY TEST: CPT | Mod: S$GLB,,, | Performed by: STUDENT IN AN ORGANIZED HEALTH CARE EDUCATION/TRAINING PROGRAM

## 2023-01-31 PROCEDURE — 99080 OSHA QUESTIONNAIRE: ICD-10-PCS | Mod: S$GLB,,, | Performed by: STUDENT IN AN ORGANIZED HEALTH CARE EDUCATION/TRAINING PROGRAM

## 2023-01-31 PROCEDURE — 80306 DRUG TEST PRSMV INSTRMNT: CPT | Mod: S$GLB,,, | Performed by: STUDENT IN AN ORGANIZED HEALTH CARE EDUCATION/TRAINING PROGRAM

## 2023-01-31 PROCEDURE — 99499 PHYSICAL, RECERT DOT/CDL: ICD-10-PCS | Mod: S$GLB,,, | Performed by: STUDENT IN AN ORGANIZED HEALTH CARE EDUCATION/TRAINING PROGRAM

## 2023-01-31 PROCEDURE — 99499 UNLISTED E&M SERVICE: CPT | Mod: S$GLB,,, | Performed by: STUDENT IN AN ORGANIZED HEALTH CARE EDUCATION/TRAINING PROGRAM

## 2023-01-31 PROCEDURE — 99002 DEVICE HANDLING PHYS/QHP: CPT | Mod: S$GLB,,, | Performed by: STUDENT IN AN ORGANIZED HEALTH CARE EDUCATION/TRAINING PROGRAM

## 2023-01-31 PROCEDURE — 94010 PULMONARY FUNCTION SCREENING (OCC MED PHYSICALS): ICD-10-PCS | Mod: S$GLB,,, | Performed by: STUDENT IN AN ORGANIZED HEALTH CARE EDUCATION/TRAINING PROGRAM

## 2023-01-31 PROCEDURE — 99080 SPECIAL REPORTS OR FORMS: CPT | Mod: S$GLB,,, | Performed by: STUDENT IN AN ORGANIZED HEALTH CARE EDUCATION/TRAINING PROGRAM

## 2023-07-27 ENCOUNTER — TELEPHONE (OUTPATIENT)
Dept: ORTHOPEDICS | Facility: CLINIC | Age: 23
End: 2023-07-27
Payer: COMMERCIAL

## 2023-07-27 NOTE — TELEPHONE ENCOUNTER
----- Message from Louise Mclain sent at 7/27/2023  8:42 AM CDT -----  Regarding: appt request  Contact: pt [phone]  Pt requesting call back RE: would like schedule from referral for Closed fracture of right tibia and fibula, initial encounter [S82.201A, S82.401A... Visit is related to fall I was denied scheduling       Confirmed contact below:  Contact Name:Michael Diaz  Phone Number: [phone]

## 2023-07-28 ENCOUNTER — TELEPHONE (OUTPATIENT)
Dept: ORTHOPEDICS | Facility: CLINIC | Age: 23
End: 2023-07-28
Payer: COMMERCIAL

## 2023-07-28 NOTE — TELEPHONE ENCOUNTER
Spoke with patient. Informed patient that his fracture will require surgery. His appt will be changed to [place] with Dr. Chino on 8/1 and surgery will be 8/2. Patient v/u and was grateful. All questions answered.

## 2023-08-01 ENCOUNTER — HOSPITAL ENCOUNTER (OUTPATIENT)
Dept: PREADMISSION TESTING | Facility: HOSPITAL | Age: 23
Discharge: HOME | End: 2023-08-01
Attending: ORTHOPAEDIC SURGERY
Payer: COMMERCIAL

## 2023-08-01 ENCOUNTER — OFFICE VISIT (OUTPATIENT)
Dept: ORTHOPEDICS | Facility: CLINIC | Age: 23
End: 2023-08-01
Payer: COMMERCIAL

## 2023-08-01 ENCOUNTER — ANESTHESIA EVENT (OUTPATIENT)
Dept: SURGERY | Facility: HOSPITAL | Age: 23
End: 2023-08-01
Payer: COMMERCIAL

## 2023-08-01 VITALS — HEIGHT: 72 IN | RESPIRATION RATE: 16 BRPM | WEIGHT: 211 LBS | BODY MASS INDEX: 28.58 KG/M2

## 2023-08-01 VITALS — WEIGHT: 280 LBS | BODY MASS INDEX: 37.97 KG/M2

## 2023-08-01 DIAGNOSIS — S82.401A CLOSED FRACTURE OF RIGHT TIBIA AND FIBULA, INITIAL ENCOUNTER: ICD-10-CM

## 2023-08-01 DIAGNOSIS — S82.201A CLOSED FRACTURE OF RIGHT TIBIA AND FIBULA, INITIAL ENCOUNTER: ICD-10-CM

## 2023-08-01 DIAGNOSIS — Z01.818 PRE-OP EXAM: Primary | ICD-10-CM

## 2023-08-01 PROCEDURE — 99205 PR OFFICE/OUTPT VISIT, NEW, LEVL V, 60-74 MIN: ICD-10-PCS | Mod: 57,S$GLB,, | Performed by: ORTHOPAEDIC SURGERY

## 2023-08-01 PROCEDURE — 99999 PR PBB SHADOW E&M-EST. PATIENT-LVL III: ICD-10-PCS | Mod: PBBFAC,,, | Performed by: ORTHOPAEDIC SURGERY

## 2023-08-01 PROCEDURE — 99999 PR PBB SHADOW E&M-EST. PATIENT-LVL III: CPT | Mod: PBBFAC,,, | Performed by: ORTHOPAEDIC SURGERY

## 2023-08-01 PROCEDURE — 99205 OFFICE O/P NEW HI 60 MIN: CPT | Mod: 57,S$GLB,, | Performed by: ORTHOPAEDIC SURGERY

## 2023-08-01 RX ORDER — ASPIRIN 81 MG/1
81 TABLET ORAL 2 TIMES DAILY
Qty: 14 TABLET | Refills: 0 | Status: SHIPPED | OUTPATIENT
Start: 2023-08-01 | End: 2024-07-31

## 2023-08-01 RX ORDER — CYCLOBENZAPRINE HCL 10 MG
10 TABLET ORAL 3 TIMES DAILY PRN
Qty: 30 TABLET | Refills: 0 | Status: SHIPPED | OUTPATIENT
Start: 2023-08-01 | End: 2023-08-15 | Stop reason: SDUPTHER

## 2023-08-01 RX ORDER — ONDANSETRON 4 MG/1
4 TABLET, ORALLY DISINTEGRATING ORAL EVERY 8 HOURS PRN
Qty: 30 TABLET | Refills: 0 | Status: SHIPPED | OUTPATIENT
Start: 2023-08-01

## 2023-08-01 RX ORDER — OXYCODONE AND ACETAMINOPHEN 5; 325 MG/1; MG/1
1 TABLET ORAL EVERY 6 HOURS PRN
Qty: 28 TABLET | Refills: 0 | Status: SHIPPED | OUTPATIENT
Start: 2023-08-01 | End: 2023-08-15 | Stop reason: SDUPTHER

## 2023-08-01 RX ORDER — MUPIROCIN 20 MG/G
OINTMENT TOPICAL
Status: CANCELLED | OUTPATIENT
Start: 2023-08-01

## 2023-08-01 RX ORDER — IBUPROFEN 600 MG/1
600 TABLET ORAL EVERY 6 HOURS PRN
Qty: 90 TABLET | Refills: 0 | Status: SHIPPED | OUTPATIENT
Start: 2023-08-01 | End: 2023-09-12 | Stop reason: SDUPTHER

## 2023-08-01 NOTE — PROGRESS NOTES
Patient ID: Michael Diaz is a 22 y.o. male    Chief Complaint:   Chief Complaint   Patient presents with    Right Ankle - Pain, Injury       History of Present Illness:    This is a pleasant 22 y.o. male who presents for evaluation of right ankle pain. He reports an injury 1 week ago while falling down steps. He  had immediate pain and difficulty bearing weight on that extremity. He presented to outside ER and was diagnosed with a distal tibia and fibula fracture. He was placed in a splint and referred to us for orthopedic evaluation. He is independent with activities of daily living at baseline. He walks with an assistive device.     Diabetic: No  Smoker: No  Hx of DVT, PE: No      PAST MEDICAL HISTORY:   Past Medical History:   Diagnosis Date    ADHD (attention deficit hyperactivity disorder)      PAST SURGICAL HISTORY:   Past Surgical History:   Procedure Laterality Date    ORIF FOREARM FRACTURE Right      FAMILY HISTORY: No family history on file.  SOCIAL HISTORY:   Social History     Occupational History    Not on file   Tobacco Use    Smoking status: Some Days     Current packs/day: 0.00     Types: Vaping with nicotine    Smokeless tobacco: Never   Substance and Sexual Activity    Alcohol use: Yes     Comment: 6 drinks weekly    Drug use: Not Currently     Types: Marijuana    Sexual activity: Not on file        MEDICATIONS:   Current Outpatient Medications:     EScitalopram oxalate (LEXAPRO) 20 MG tablet, Take 1 tablet (20 mg total) by mouth once daily., Disp: 30 tablet, Rfl: 2    HYDROcodone-acetaminophen (NORCO)  mg per tablet, Take 1 tablet by mouth every 6 (six) hours as needed for Pain., Disp: 15 tablet, Rfl: 0    hydrOXYzine HCL (ATARAX) 10 MG Tab, Take 1 tablet (10 mg total) by mouth nightly as needed (insomnia)., Disp: 30 tablet, Rfl: 5  ALLERGIES: Review of patient's allergies indicates:  No Known Allergies      Physical Exam     Vitals:    08/01/23 1053   Resp: 16     Alert and  oriented to person, place and time. No acute distress. Well-groomed, not ill appearing. Pupils round and reactive, normal respiratory effort, no audible wheezing.     Deformity of the right ankle without evidence of open fracture.  There is appropriate soft tissue swelling.  Compartments are soft and compressible.  Neurovascularly intact.  Full range of motion of the knee.  Brisk capillary refill to the toes.        Imaging:       X-Ray: I have reviewed all pertinent results/findings and my personal findings are:  Distal spiral tibial shaft fracture without evidence of involvement of the joint surface.  There is comminuted Perea B fibula fracture      Assessment & Plan    Pre-op exam  -     CBC Auto Differential; Future; Expected date: 08/01/2023  -     Comprehensive Metabolic Panel; Future; Expected date: 08/01/2023         Treatment options discussed with him in detail.  He has a significant injury to his right leg with tibia and fibula fracture with significant displacement.  No evidence of compartment syndrome.  This is a week out at this point.  Discussed need for urgent intervention with intramedullary nailing of the right leg and possible ORIF of the fibula pending soft tissue swelling.  We will plan on this tomorrow.  Discussed the risk of DVT, compartment syndrome, loss of function and disability and possible need for future surgery    _________________________________________________________________      Diagnosis and findings were discussed with him in lay terms. I discussed the findings and treatment options with them at length. Questions were actively sought and all questions were answered to their apparent satisfaction. We discussed the risks and benefits of surgery. We reviewed the operative indications surgical technique and potential rehabilitation involved. Risks including, but not limited to pain, bleeding, infection, damage to surrounding neurovascular structures, and other soft tissues,  decreased range of motion, instability, poor cosmetic result, scarring/painful scars, poor functional result, reflex sympathetic dystrophy. We discussed as well the risk of anesthesia, DVT/PE and possible need for additional surgical intervention in lay terms. Despite the risks as explained to them, they wished to proceed with surgery. He expressed understanding and agreement with the treatment plan and understand that no guarantee of outcome is implied or expressed given.       Michael Diaz will be scheduled for the following procedure(s):     Intramedullary nailing right tibia  Possible ORIF distal fibula        Post-Op Medications to be prescribed:   Percocet 5/325mg Take 1-2 tablets every 4-6 hours PRN pain #28  Zofran 4mg oral disintegrating tablets every 8 hours PRN nausea/vomiting   ASA 81mg daily x 4 weeks  Motrin 600 mg TID PRN     DME/Bracing:  None  Post-op Physical Therapy to begin: 4 weeks    Medical Clearance: No  Hx of DVT,PE, anesthetic complications: No    Additional notes/concerns:     Opioid Disclosure  Today we discussed the reasons why the prescription is necessary as well as: the risks of addiction and overdose associated with opioid drugs and the dangers of taking opioid drugs with alcohol, benzodiazepines and other central nervous system depressants; alternative treatments that may be available; the risks associated with the use of the drugs being prescribed, specifically that opioids are highly addictive, even when taken as prescribed, that there is a risk of developing a physical or psychological dependence on the controlled dangerous substance, and that the risks of taking more opioids than prescribed or mixing sedatives, benzodiazepines or alcohol with opioids can result in fatal respiratory depression.

## 2023-08-01 NOTE — DISCHARGE INSTRUCTIONS
To confirm, Your doctor has instructed you that surgery is scheduled for: 8/2/23 with Dr. Chino    Please report to [facility], Registration the morning of surgery. You must check-in and receive a wristband before going to your procedure.  [address] [place] [address] [place] [address]    Pre-Op will call the afternoon prior to surgery between 1:00 and 6:00 PM with the final arrival time.  Phone number: [phone]    PLEASE NOTE:  The surgery schedule has many variables which may affect the time of your surgery case.  Family members should be available if your surgery time changes.  Plan to be here the day of your procedure between 4-6 hours.    MEDICATIONS:  TAKE ONLY THESE MEDICATIONS WITH A SMALL SIP OF WATER THE MORNING OF YOUR PROCEDURE:  Hydrocodone if needed      DO NOT TAKE THESE MEDICATIONS 5-7 DAYS PRIOR to your procedure or per your surgeon's request:   ASPIRIN, ALEVE, ADVIL, IBUPROFEN, FISH OIL VITAMIN E, HERBALS  (May take Tylenol)    ONLY if you are prescribed any types of blood thinners such as:  Aspirin, Coumadin, Plavix, Pradaxa, Xarelto, Aggrenox, Effient, Eliquis, Savasya, Brilinta, or any other, ask your surgeon whether you should stop taking them and how long before surgery you should stop.  You may also need to verify with the prescribing physician if it is ok to stop your medication.      INSTRUCTIONS IMPORTANT!!  Do not eat or drink anything between midnight and the time of your procedure- this includes gum, mints, and candy.  Do not smoke or drink alcoholic beverages 24 hours prior to your procedure.  Shower the night before AND the morning of your procedure with a Chlorhexidine wash such as Hibiclens or Dial antibacterial soap from the neck down.  Do not get it on your face or in your eyes.  You may use your own shampoo and face wash. This helps your skin to be as bacteria free as possible.    If you wear contact lenses, dentures, hearing aids or glasses, bring a container  to put them in during surgery and give to a family member for safe keeping.  Please leave all jewelry, piercing's and valuables at home. You must remove your false eyelashes prior to surgery.    DO NOT remove hair from the surgery site.  Do not shave the incision site unless you are given specific instructions to do so.    ONLY if you have been diagnosed with sleep apnea please bring your C-PAP machine.  ONLY if you wear home oxygen please bring your portable oxygen tank the day of your procedure.  ONLY if you have a history of OPEN HEART SURGERY you will need a clearance from your Cardiologist per Anesthesia.      ONLY for patients requiring bowel prep, written instructions will be given by your doctor's office.  ONLY if you have a neuro stimulator, please bring the controller with you the morning of surgery  ONLY if a type and screen test is needed before surgery, please return:  If your doctor has scheduled you for an overnight stay, bring a small overnight bag with any personal items you need.  Make arrangements in advance for transportation home by a responsible adult.  It is not safe to drive a vehicle during the 24 hours after anesthesia.          All  facilities and properties are tobacco free.  Smoking is NOT allowed.   If you have any questions about these instructions, call Pre-Op Admit  Nursing at [phone] or the Pre-Op Day Surgery Unit at [phone].

## 2023-08-02 ENCOUNTER — HOSPITAL ENCOUNTER (OUTPATIENT)
Facility: HOSPITAL | Age: 23
Discharge: HOME | End: 2023-08-02
Attending: ORTHOPAEDIC SURGERY | Admitting: ORTHOPAEDIC SURGERY
Payer: COMMERCIAL

## 2023-08-02 ENCOUNTER — ANESTHESIA (OUTPATIENT)
Dept: SURGERY | Facility: HOSPITAL | Age: 23
End: 2023-08-02
Payer: COMMERCIAL

## 2023-08-02 DIAGNOSIS — S82.201A CLOSED FRACTURE OF RIGHT TIBIA AND FIBULA, INITIAL ENCOUNTER: ICD-10-CM

## 2023-08-02 DIAGNOSIS — Z01.818 PRE-OP EXAM: ICD-10-CM

## 2023-08-02 DIAGNOSIS — S82.401A CLOSED FRACTURE OF RIGHT TIBIA AND FIBULA, INITIAL ENCOUNTER: ICD-10-CM

## 2023-08-02 PROCEDURE — 27759 TREATMENT OF TIBIA FRACTURE: CPT | Mod: RT,,, | Performed by: ORTHOPAEDIC SURGERY

## 2023-08-02 PROCEDURE — 64447 PERIPHERAL BLOCK: ICD-10-PCS | Mod: 59,RT,, | Performed by: ANESTHESIOLOGY

## 2023-08-02 PROCEDURE — 27786 PR CLOSED RX DIST FIBULA FX: ICD-10-PCS | Mod: 51,RT,, | Performed by: ORTHOPAEDIC SURGERY

## 2023-08-02 PROCEDURE — 25000003 PHARM REV CODE 250: Performed by: ANESTHESIOLOGY

## 2023-08-02 PROCEDURE — 27200750 HC INSULATED NEEDLE/ STIMUPLEX: Performed by: ANESTHESIOLOGY

## 2023-08-02 PROCEDURE — D9220A PRA ANESTHESIA: ICD-10-PCS | Mod: CRNA,,, | Performed by: NURSE ANESTHETIST, CERTIFIED REGISTERED

## 2023-08-02 PROCEDURE — 27200651 HC AIRWAY, LMA: Performed by: ANESTHESIOLOGY

## 2023-08-02 PROCEDURE — 27201423 OPTIME MED/SURG SUP & DEVICES STERILE SUPPLY: Performed by: ORTHOPAEDIC SURGERY

## 2023-08-02 PROCEDURE — 63600175 PHARM REV CODE 636 W HCPCS: Performed by: ANESTHESIOLOGY

## 2023-08-02 PROCEDURE — 37000008 HC ANESTHESIA 1ST 15 MINUTES: Performed by: ORTHOPAEDIC SURGERY

## 2023-08-02 PROCEDURE — 64445 NJX AA&/STRD SCIATIC NRV IMG: CPT | Mod: 59 | Performed by: ANESTHESIOLOGY

## 2023-08-02 PROCEDURE — D9220A PRA ANESTHESIA: ICD-10-PCS | Mod: ANES,,, | Performed by: ANESTHESIOLOGY

## 2023-08-02 PROCEDURE — C1713 ANCHOR/SCREW BN/BN,TIS/BN: HCPCS | Performed by: ORTHOPAEDIC SURGERY

## 2023-08-02 PROCEDURE — C9290 INJ, BUPIVACAINE LIPOSOME: HCPCS | Performed by: ANESTHESIOLOGY

## 2023-08-02 PROCEDURE — 63600175 PHARM REV CODE 636 W HCPCS: Performed by: ORTHOPAEDIC SURGERY

## 2023-08-02 PROCEDURE — 94799 UNLISTED PULMONARY SVC/PX: CPT

## 2023-08-02 PROCEDURE — D9220A PRA ANESTHESIA: Mod: ANES,,, | Performed by: ANESTHESIOLOGY

## 2023-08-02 PROCEDURE — 63600175 PHARM REV CODE 636 W HCPCS: Performed by: NURSE ANESTHETIST, CERTIFIED REGISTERED

## 2023-08-02 PROCEDURE — 64450 NJX AA&/STRD OTHER PN/BRANCH: CPT | Mod: 59,RT,, | Performed by: ANESTHESIOLOGY

## 2023-08-02 PROCEDURE — 37000009 HC ANESTHESIA EA ADD 15 MINS: Performed by: ORTHOPAEDIC SURGERY

## 2023-08-02 PROCEDURE — 64450 PERIPHERAL BLOCK: ICD-10-PCS | Mod: 59,RT,, | Performed by: ANESTHESIOLOGY

## 2023-08-02 PROCEDURE — 36000710: Performed by: ORTHOPAEDIC SURGERY

## 2023-08-02 PROCEDURE — 25000003 PHARM REV CODE 250: Performed by: ORTHOPAEDIC SURGERY

## 2023-08-02 PROCEDURE — 27759 PR TREAT TIBIAL SHAFT FX, INTRAMED IMPLANT: ICD-10-PCS | Mod: RT,,, | Performed by: ORTHOPAEDIC SURGERY

## 2023-08-02 PROCEDURE — 36000711: Performed by: ORTHOPAEDIC SURGERY

## 2023-08-02 PROCEDURE — 71000039 HC RECOVERY, EACH ADD'L HOUR: Performed by: ORTHOPAEDIC SURGERY

## 2023-08-02 PROCEDURE — D9220A PRA ANESTHESIA: Mod: CRNA,,, | Performed by: NURSE ANESTHETIST, CERTIFIED REGISTERED

## 2023-08-02 PROCEDURE — 63600175 PHARM REV CODE 636 W HCPCS

## 2023-08-02 PROCEDURE — 71000015 HC POSTOP RECOV 1ST HR: Performed by: ORTHOPAEDIC SURGERY

## 2023-08-02 PROCEDURE — C1769 GUIDE WIRE: HCPCS | Performed by: ORTHOPAEDIC SURGERY

## 2023-08-02 PROCEDURE — 64447 NJX AA&/STRD FEMORAL NRV IMG: CPT | Mod: 59,RT,, | Performed by: ANESTHESIOLOGY

## 2023-08-02 PROCEDURE — 25000003 PHARM REV CODE 250: Performed by: NURSE ANESTHETIST, CERTIFIED REGISTERED

## 2023-08-02 PROCEDURE — 71000033 HC RECOVERY, INTIAL HOUR: Performed by: ORTHOPAEDIC SURGERY

## 2023-08-02 PROCEDURE — 27786 TREATMENT OF ANKLE FRACTURE: CPT | Mod: 51,RT,, | Performed by: ORTHOPAEDIC SURGERY

## 2023-08-02 DEVICE — SCREW STRDRV REC T25 5X44 TTNM: Type: IMPLANTABLE DEVICE | Site: TIBIA | Status: FUNCTIONAL

## 2023-08-02 DEVICE — SCREW LOCKING 40MM: Type: IMPLANTABLE DEVICE | Site: TIBIA | Status: FUNCTIONAL

## 2023-08-02 DEVICE — IMPLANTABLE DEVICE: Type: IMPLANTABLE DEVICE | Site: LEG | Status: FUNCTIONAL

## 2023-08-02 DEVICE — SCREW STRDRV REC T25 5X32 TTNM: Type: IMPLANTABLE DEVICE | Site: TIBIA | Status: FUNCTIONAL

## 2023-08-02 DEVICE — IMPLANTABLE DEVICE: Type: IMPLANTABLE DEVICE | Site: TIBIA | Status: FUNCTIONAL

## 2023-08-02 RX ORDER — PROPOFOL 10 MG/ML
VIAL (ML) INTRAVENOUS
Status: DISCONTINUED | OUTPATIENT
Start: 2023-08-02 | End: 2023-08-02

## 2023-08-02 RX ORDER — OXYCODONE HYDROCHLORIDE 10 MG/1
10 TABLET ORAL EVERY 4 HOURS PRN
Status: CANCELLED | OUTPATIENT
Start: 2023-08-02

## 2023-08-02 RX ORDER — MIDAZOLAM HYDROCHLORIDE 1 MG/ML
INJECTION INTRAMUSCULAR; INTRAVENOUS
Status: DISCONTINUED | OUTPATIENT
Start: 2023-08-02 | End: 2023-08-02

## 2023-08-02 RX ORDER — OXYCODONE HYDROCHLORIDE 5 MG/1
5 TABLET ORAL ONCE AS NEEDED
Status: COMPLETED | OUTPATIENT
Start: 2023-08-02 | End: 2023-08-02

## 2023-08-02 RX ORDER — BUPIVACAINE HYDROCHLORIDE 5 MG/ML
INJECTION, SOLUTION EPIDURAL; INTRACAUDAL
Status: COMPLETED | OUTPATIENT
Start: 2023-08-02 | End: 2023-08-02

## 2023-08-02 RX ORDER — LIDOCAINE HYDROCHLORIDE 20 MG/ML
INJECTION INTRAVENOUS
Status: DISCONTINUED | OUTPATIENT
Start: 2023-08-02 | End: 2023-08-02

## 2023-08-02 RX ORDER — KETAMINE HYDROCHLORIDE 100 MG/ML
INJECTION, SOLUTION INTRAMUSCULAR; INTRAVENOUS
Status: DISCONTINUED | OUTPATIENT
Start: 2023-08-02 | End: 2023-08-02

## 2023-08-02 RX ORDER — ONDANSETRON HYDROCHLORIDE 2 MG/ML
INJECTION, SOLUTION INTRAMUSCULAR; INTRAVENOUS
Status: DISCONTINUED | OUTPATIENT
Start: 2023-08-02 | End: 2023-08-02

## 2023-08-02 RX ORDER — KETOROLAC TROMETHAMINE 30 MG/ML
INJECTION, SOLUTION INTRAMUSCULAR; INTRAVENOUS
Status: DISCONTINUED | OUTPATIENT
Start: 2023-08-02 | End: 2023-08-02

## 2023-08-02 RX ORDER — FENTANYL CITRATE 50 UG/ML
INJECTION, SOLUTION INTRAMUSCULAR; INTRAVENOUS
Status: DISCONTINUED | OUTPATIENT
Start: 2023-08-02 | End: 2023-08-02

## 2023-08-02 RX ORDER — TRAMADOL HYDROCHLORIDE 50 MG/1
50 TABLET ORAL EVERY 4 HOURS PRN
Status: DISCONTINUED | OUTPATIENT
Start: 2023-08-02 | End: 2023-08-02 | Stop reason: HOSPADM

## 2023-08-02 RX ORDER — ONDANSETRON 2 MG/ML
4 INJECTION INTRAMUSCULAR; INTRAVENOUS ONCE AS NEEDED
Status: COMPLETED | OUTPATIENT
Start: 2023-08-02 | End: 2023-08-02

## 2023-08-02 RX ORDER — LIDOCAINE HYDROCHLORIDE 10 MG/ML
1 INJECTION, SOLUTION EPIDURAL; INFILTRATION; INTRACAUDAL; PERINEURAL ONCE
Status: DISCONTINUED | OUTPATIENT
Start: 2023-08-02 | End: 2023-08-02 | Stop reason: HOSPADM

## 2023-08-02 RX ORDER — CEFAZOLIN SODIUM 2 G/50ML
2 SOLUTION INTRAVENOUS
Status: COMPLETED | OUTPATIENT
Start: 2023-08-02 | End: 2023-08-02

## 2023-08-02 RX ORDER — FENTANYL CITRATE 50 UG/ML
25 INJECTION, SOLUTION INTRAMUSCULAR; INTRAVENOUS EVERY 5 MIN PRN
Status: COMPLETED | OUTPATIENT
Start: 2023-08-02 | End: 2023-08-02

## 2023-08-02 RX ORDER — HYDROMORPHONE HYDROCHLORIDE 2 MG/ML
INJECTION, SOLUTION INTRAMUSCULAR; INTRAVENOUS; SUBCUTANEOUS
Status: DISCONTINUED | OUTPATIENT
Start: 2023-08-02 | End: 2023-08-02

## 2023-08-02 RX ORDER — OXYCODONE HYDROCHLORIDE 5 MG/1
5 TABLET ORAL EVERY 4 HOURS PRN
Status: CANCELLED | OUTPATIENT
Start: 2023-08-02

## 2023-08-02 RX ORDER — METHOCARBAMOL 500 MG/1
500 TABLET, FILM COATED ORAL EVERY 6 HOURS PRN
Status: CANCELLED | OUTPATIENT
Start: 2023-08-02

## 2023-08-02 RX ORDER — ACETAMINOPHEN 10 MG/ML
INJECTION, SOLUTION INTRAVENOUS
Status: DISCONTINUED | OUTPATIENT
Start: 2023-08-02 | End: 2023-08-02

## 2023-08-02 RX ORDER — DEXAMETHASONE SODIUM PHOSPHATE 4 MG/ML
INJECTION, SOLUTION INTRA-ARTICULAR; INTRALESIONAL; INTRAMUSCULAR; INTRAVENOUS; SOFT TISSUE
Status: DISCONTINUED | OUTPATIENT
Start: 2023-08-02 | End: 2023-08-02

## 2023-08-02 RX ORDER — PROCHLORPERAZINE EDISYLATE 5 MG/ML
5 INJECTION INTRAMUSCULAR; INTRAVENOUS EVERY 6 HOURS PRN
Status: CANCELLED | OUTPATIENT
Start: 2023-08-02

## 2023-08-02 RX ORDER — MUPIROCIN 20 MG/G
OINTMENT TOPICAL
Status: DISCONTINUED | OUTPATIENT
Start: 2023-08-02 | End: 2023-08-02 | Stop reason: HOSPADM

## 2023-08-02 RX ORDER — ONDANSETRON 4 MG/1
8 TABLET, ORALLY DISINTEGRATING ORAL EVERY 8 HOURS PRN
Status: CANCELLED | OUTPATIENT
Start: 2023-08-02

## 2023-08-02 RX ORDER — BUPIVACAINE HYDROCHLORIDE 5 MG/ML
INJECTION, SOLUTION EPIDURAL; INTRACAUDAL
Status: DISCONTINUED | OUTPATIENT
Start: 2023-08-02 | End: 2023-08-02

## 2023-08-02 RX ADMIN — FENTANYL CITRATE 25 MCG: 50 INJECTION, SOLUTION INTRAMUSCULAR; INTRAVENOUS at 04:08

## 2023-08-02 RX ADMIN — FENTANYL CITRATE 50 MCG: 0.05 INJECTION, SOLUTION INTRAMUSCULAR; INTRAVENOUS at 02:08

## 2023-08-02 RX ADMIN — DEXAMETHASONE SODIUM PHOSPHATE 8 MG: 4 INJECTION, SOLUTION INTRAMUSCULAR; INTRAVENOUS at 01:08

## 2023-08-02 RX ADMIN — KETOROLAC TROMETHAMINE 30 MG: 30 INJECTION, SOLUTION INTRAMUSCULAR; INTRAVENOUS at 03:08

## 2023-08-02 RX ADMIN — SODIUM CHLORIDE, SODIUM GLUCONATE, SODIUM ACETATE, POTASSIUM CHLORIDE AND MAGNESIUM CHLORIDE: 526; 502; 368; 37; 30 INJECTION, SOLUTION INTRAVENOUS at 02:08

## 2023-08-02 RX ADMIN — FENTANYL CITRATE 50 MCG: 0.05 INJECTION, SOLUTION INTRAMUSCULAR; INTRAVENOUS at 01:08

## 2023-08-02 RX ADMIN — KETAMINE HYDROCHLORIDE 25 MG: 100 INJECTION, SOLUTION, CONCENTRATE INTRAMUSCULAR; INTRAVENOUS at 02:08

## 2023-08-02 RX ADMIN — OXYCODONE HYDROCHLORIDE 5 MG: 5 TABLET ORAL at 04:08

## 2023-08-02 RX ADMIN — ONDANSETRON 4 MG: 2 INJECTION INTRAMUSCULAR; INTRAVENOUS at 01:08

## 2023-08-02 RX ADMIN — LIDOCAINE HYDROCHLORIDE 100 MG: 20 INJECTION, SOLUTION INTRAVENOUS at 01:08

## 2023-08-02 RX ADMIN — BUPIVACAINE HYDROCHLORIDE 5 ML: 5 INJECTION, SOLUTION EPIDURAL; INTRACAUDAL at 12:08

## 2023-08-02 RX ADMIN — CEFAZOLIN SODIUM 2 G: 2 SOLUTION INTRAVENOUS at 01:08

## 2023-08-02 RX ADMIN — MIDAZOLAM HYDROCHLORIDE 2 MG: 1 INJECTION, SOLUTION INTRAMUSCULAR; INTRAVENOUS at 01:08

## 2023-08-02 RX ADMIN — GLYCOPYRROLATE 0.2 MG: 0.2 INJECTION, SOLUTION INTRAMUSCULAR; INTRAVITREAL at 01:08

## 2023-08-02 RX ADMIN — ONDANSETRON 4 MG: 2 INJECTION INTRAMUSCULAR; INTRAVENOUS at 04:08

## 2023-08-02 RX ADMIN — BUPIVACAINE HYDROCHLORIDE 10 ML: 5 INJECTION, SOLUTION EPIDURAL; INTRACAUDAL; PERINEURAL at 12:08

## 2023-08-02 RX ADMIN — PROPOFOL 200 MG: 10 INJECTION, EMULSION INTRAVENOUS at 01:08

## 2023-08-02 RX ADMIN — MIDAZOLAM HYDROCHLORIDE 2 MG: 1 INJECTION, SOLUTION INTRAMUSCULAR; INTRAVENOUS at 12:08

## 2023-08-02 RX ADMIN — FENTANYL CITRATE 100 MCG: 0.05 INJECTION, SOLUTION INTRAMUSCULAR; INTRAVENOUS at 12:08

## 2023-08-02 RX ADMIN — MUPIROCIN: 20 OINTMENT TOPICAL at 10:08

## 2023-08-02 RX ADMIN — ACETAMINOPHEN 1000 MG: 10 INJECTION, SOLUTION INTRAVENOUS at 01:08

## 2023-08-02 RX ADMIN — SODIUM CHLORIDE, SODIUM GLUCONATE, SODIUM ACETATE, POTASSIUM CHLORIDE AND MAGNESIUM CHLORIDE: 526; 502; 368; 37; 30 INJECTION, SOLUTION INTRAVENOUS at 10:08

## 2023-08-02 RX ADMIN — HYDROMORPHONE HYDROCHLORIDE 1 MG: 2 INJECTION INTRAMUSCULAR; INTRAVENOUS; SUBCUTANEOUS at 03:08

## 2023-08-02 RX ADMIN — BUPIVACAINE 15 ML: 13.3 INJECTION, SUSPENSION, LIPOSOMAL INFILTRATION at 12:08

## 2023-08-02 NOTE — DISCHARGE INSTRUCTIONS
"Discharge Instructions: After Your Surgery/Procedure  Youve just had surgery. During surgery you were given medicine called anesthesia to keep you relaxed and free of pain. After surgery you may have some pain or nausea. This is common. Here are some tips for feeling better and getting well after surgery.     Stay on schedule with your medication.   Going home  Your doctor or nurse will show you how to take care of yourself when you go home. He or she will also answer your questions. Have an adult family member or friend drive you home.      For your safety we recommend these precaution for the first 24 hours after your procedure:  Do not drive or use heavy equipment.  Do not make important decisions or sign legal papers.  Do not drink alcohol.  Have someone stay with you, if needed. He or she can watch for problems and help keep you safe.  Your concentration, balance, coordination, and judgement may be impaired for many hours after anesthesia.  Use caution when ambulating or standing up.     You may feel weak and "washed out" after anesthesia and surgery.      Subtle residual effects of general anesthesia or sedation with regional / local anesthesia can last more than 24 hours.  Rest for the remainder of the day or longer if your Doctor/Surgeon has advised you to do so.  Although you may feel normal within the first 24 hours, your reflexes and mental ability may be impaired without you realizing it.  You may feel dizzy, lightheaded or sleepy for 24 hours or longer.      Be sure to go to all follow-up visits with your doctor. And rest after your surgery for as long as your doctor tells you to.  Coping with pain  If you have pain after surgery, pain medicine will help you feel better. Take it as told, before pain becomes severe. Also, ask your doctor or pharmacist about other ways to control pain. This might be with heat, ice, or relaxation. And follow any other instructions your surgeon or nurse gives you.  Tips " for taking pain medicine  To get the best relief possible, remember these points:  Pain medicines can upset your stomach. Taking them with a little food may help.  Most pain relievers taken by mouth need at least 20 to 30 minutes to start to work.  Taking medicine on a schedule can help you remember to take it. Try to time your medicine so that you can take it before starting an activity. This might be before you get dressed, go for a walk, or sit down for dinner.  Constipation is a common side effect of pain medicines. Call your doctor before taking any medicines such as laxatives or stool softeners to help ease constipation. Also ask if you should skip any foods. Drinking lots of fluids and eating foods such as fruits and vegetables that are high in fiber can also help. Remember, do not take laxatives unless your surgeon has prescribed them.  Drinking alcohol and taking pain medicine can cause dizziness and slow your breathing. It can even be deadly. Do not drink alcohol while taking pain medicine.  Pain medicine can make you react more slowly to things. Do not drive or run machinery while taking pain medicine.  Your health care provider may tell you to take acetaminophen to help ease your pain. Ask him or her how much you are supposed to take each day. Acetaminophen or other pain relievers may interact with your prescription medicines or other over-the-counter (OTC) drugs. Some prescription medicines have acetaminophen and other ingredients. Using both prescription and OTC acetaminophen for pain can cause you to overdose. Read the labels on your OTC medicines with care. This will help you to clearly know the list of ingredients, how much to take, and any warnings. It may also help you not take too much acetaminophen. If you have questions or do not understand the information, ask your pharmacist or health care provider to explain it to you before you take the OTC medicine.  Managing nausea  Some people have an  upset stomach after surgery. This is often because of anesthesia, pain, or pain medicine, or the stress of surgery. These tips will help you handle nausea and eat healthy foods as you get better. If you were on a special food plan before surgery, ask your doctor if you should follow it while you get better. These tips may help:  Do not push yourself to eat. Your body will tell you when to eat and how much.  Start off with clear liquids and soup. They are easier to digest.  Next try semi-solid foods, such as mashed potatoes, applesauce, and gelatin, as you feel ready.  Slowly move to solid foods. Dont eat fatty, rich, or spicy foods at first.  Do not force yourself to have 3 large meals a day. Instead eat smaller amounts more often.  Take pain medicines with a small amount of solid food, such as crackers or toast, to avoid nausea.     Call your surgeon if  You still have pain an hour after taking medicine. The medicine may not be strong enough.  You feel too sleepy, dizzy, or groggy. The medicine may be too strong.  You have side effects like nausea, vomiting, or skin changes, such as rash, itching, or hives.       If you have obstructive sleep apnea  You were given anesthesia medicine during surgery to keep you comfortable and free of pain. After surgery, you may have more apnea spells because of this medicine and other medicines you were given. The spells may last longer than usual.   At home:  Keep using the continuous positive airway pressure (CPAP) device when you sleep. Unless your health care provider tells you not to, use it when you sleep, day or night. CPAP is a common device used to treat obstructive sleep apnea.  Talk with your provider before taking any pain medicine, muscle relaxants, or sedatives. Your provider will tell you about the possible dangers of taking these medicines.  © 2224-7480 [employer]. [address] [place] [address], [place] [address]. All rights reserved. This information is  not intended as a substitute for professional medical care. Always follow your healthcare professional's instructions.           Using an Incentive Spirometer    An incentive spirometer is a device that helps you do deep breathing exercises. These exercises expand your lungs, aid in circulation, and help prevent pneumonia. Deep breathing exercises also help you breathe better and improve the function of your lungs by:  Keeping your lungs clear  Strengthening your breathing muscles  Helping prevent respiratory complications or problems  The incentive spirometer gives you a way to take an active part in recover. A nurse or therapist will teach you breathing exercises. To do these exercises, you will breathe in through your mouth and not your nose. The incentive spirometer only works correctly if you breathe in through your mouth.  Steps to clear lungs  Step 1. Exhale normally. Then, inhale normally.  Relax and breathe out.  Step 2. Place your lips tightly around the mouthpiece.  Make sure the device is upright and not tilted.  Step 3. Inhale as much air as you can through the mouthpiece (don't breath through your nose).  Inhale slowly and deeply.  Hold your breath long enough to keep the balls or disk raised for at least 3 to 5 seconds, or as instructed by your healthcare provider.  Some spirometers have an indicator to let you know that you are breathing in too fast. If the indicator goes off, breathe in more slowly.  Step 4. Repeat the exercise regularly.  Do this exercise every hour while you're awake, or as instructed by your healthcare provider.  If you were taught deep breathing and coughing exercises, do them regularly as instructed by your healthcare provider.            Post op instructions for prevention of DVT  What is deep vein thrombosis?  Deep vein thrombosis (DVT) is the medical term for blood clots in the deep veins of the leg.  These blood clots can be dangerous.  A DVT can block a blood vessel and  keep blood from getting where it needs to go.  Another problem is that the clot can travel to other parts of the body such as the lungs.  A clot that travels to the lungs is called a pulmonary embolus (PE) and can cause serious problems with breathing which can lead to death.  Am I at risk for DVT/PE?  If you are not very active, you are at risk of DVT.  Anyone confined to bed, sitting for long periods of time, recovering from surgery, etc. increases the risk of DVT.  Other risk factors are cancer diagnosis, certain medications, estrogen replacement in any form,older age, obesity, pregnancy, smoking, history of clotting disorders, and dehydration.  How will I know if I have a DVT?  Swelling in the lower leg  Pain  Warmth, redness, hardness or bulging of the vein  If you have any of these symptoms, call your doctors office right away.  Some people will not have any symptoms until the clot moves to the lungs.  What are the symptoms of a PE?  Panting, shortness of breath, or trouble breathing  Sharp, knife-like chest pain when you breathe  Coughing or coughing up blood  Rapid heartbeat  If you have any of these symptoms or get worse quickly, call 911 for emergency treatment.  How can I prevent a DVT?  Avoid long periods of inactivity and dont cross your legs--get up and walk around every hour or so.  Stay active--walking after surgery is highly encouraged.  This means you should get out of the house and walk in the neighborhood.  Going up and down stairs will not impair healing (unless advised against such activity by your doctor).    Drink plenty of noncaffeinated, nonalcoholic fluids each day to prevent dehydration.  Wear special support stockings, if they have been advised by your doctor.  If you travel, stop at least once an hour and walk around.  Avoid smoking (assistance with stopping is available through your healthcare provider)  Always notify your doctor if you are not able to follow the post operative  instructions that are given to you at the time of discharge.  It may be necessary to prescribe one of the medications available to prevent DVT.             We hope your stay was comfortable as you heal now, mend and rest.    For we have enjoyed taking care of you by giving your our best.    And as you get better, by regaining your health and strength;   We count it as a privilege to have served you and hope your time at [facility] was well spent.      Thank  You!!!

## 2023-08-02 NOTE — ANESTHESIA PROCEDURE NOTES
Intubation    Date/Time: 8/2/2023 1:50 PM    Performed by: Oneal Rogel CRNA  Authorized by: Travis Lujan MD    Intubation:     Induction:  Intravenous    Intubated:  Postinduction    Mask Ventilation:  Not attempted    Attempts:  1    Attempted By:  CRNA    Difficult Airway Encountered?: No      Complications:  None    Airway Device:  Supraglottic airway/LMA    Airway Device Size:  4.0    Style/Cuff Inflation:  Cuffed (inflated to minimal occlusive pressure)    Placement Verified By:  Capnometry    Complicating Factors:  None    Findings Post-Intubation:  BS equal bilateral and atraumatic/condition of teeth unchanged

## 2023-08-02 NOTE — ANESTHESIA PROCEDURE NOTES
Peripheral Block    Patient location during procedure: pre-op   Block not for primary anesthetic.  Reason for block: at surgeon's request and post-op pain management   Post-op Pain Location: right leg   Start time: 8/2/2023 12:27 PM  Timeout: 8/2/2023 12:27 PM   End time: 8/2/2023 12:29 PM    Staffing  Authorizing Provider: Travis Lujan MD  Performing Provider: Travis Lujan MD    Staffing  Performed by: Travis Lujan MD  Authorized by: Travis Lujan MD    Preanesthetic Checklist  Completed: patient identified, IV checked, site marked, risks and benefits discussed, surgical consent, monitors and equipment checked, pre-op evaluation and timeout performed  Peripheral Block  Patient position: supine  Prep: ChloraPrep  Patient monitoring: heart rate, cardiac monitor, continuous pulse ox, continuous capnometry and frequent blood pressure checks  Block type: adductor canal  Laterality: right  Injection technique: single shot  Needle  Needle type: Stimuplex   Needle gauge: 21 G  Needle length: 4 in  Needle localization: anatomical landmarks and ultrasound guidance   -ultrasound image captured on disc.  Assessment  Injection assessment: negative aspiration, negative parasthesia and local visualized surrounding nerve  Paresthesia pain: none  Heart rate change: no  Slow fractionated injection: yes  Pain Tolerance: comfortable throughout block and no complaints  Medications:    Medications: BUPivacaine liposome (PF) 1.3 % (13.3 mg/mL) suspension - Injection   5 mL - 8/2/2023 12:29:00 PM  bupivacaine (pf) (MARCAINE) injection 0.5% - Perineural   5 mL - 8/2/2023 12:29:00 PM    Additional Notes  VSS.  [facility] RN monitoring vitals throughout procedure.  Patient tolerated procedure well.

## 2023-08-02 NOTE — ANESTHESIA POSTPROCEDURE EVALUATION
Anesthesia Post Evaluation    Patient: Michael Diaz    Procedure(s) Performed: Procedure(s) (LRB):  INSERTION, INTRAMEDULLARY STEFAN, TIBIA (suprapatellar nail SYNTHES -- regular table -- radiolucent triange -- [place]) (Right)  ORIF, FRACTURE, FIBULA (Right)    Final Anesthesia Type: general      Patient location during evaluation: PACU  Patient participation: Yes- Able to Participate  Level of consciousness: awake and alert  Post-procedure vital signs: reviewed and stable  Pain management: adequate  Airway patency: patent    PONV status at discharge: No PONV  Anesthetic complications: no      Cardiovascular status: hemodynamically stable  Respiratory status: unassisted and room air  Hydration status: euvolemic  Follow-up not needed.          Vitals Value Taken Time   /100 08/02/23 1718   Temp 36.4 °C (97.6 °F) 08/02/23 1700   Pulse 90 08/02/23 1718   Resp 18 08/02/23 1718   SpO2 97 % 08/02/23 1718         Event Time   Out of Recovery 17:05:00         Pain/Shawnee Score: Pain Rating Prior to Med Admin: 3 (8/2/2023  5:00 PM)  Pain Rating Post Med Admin: 4 (8/2/2023  4:45 PM)  Shawnee Score: 10 (8/2/2023  5:25 PM)  Modified Shawnee Score: 20 (8/2/2023  5:25 PM)

## 2023-08-02 NOTE — PLAN OF CARE
Pt prepped for surgery. Consents at bedside. Incentive spirometry taught by respiratory. Pt belongings given to pt's mom. Mom set up with text alerts.

## 2023-08-02 NOTE — BRIEF OP NOTE
[facility]  Brief Operative Note    Surgery Date: 8/2/2023     Surgeon(s) and Role:     * Shorty Chino MD - Primary    Assisting Surgeon: None    Pre-op Diagnosis:  Pre-op exam [Z01.818]  Closed fracture of right tibia and fibula, initial encounter [S82.201A, S82.401A]    Post-op Diagnosis:  Post-Op Diagnosis Codes:     * Pre-op exam [Z01.818]     * Closed fracture of right tibia and fibula, initial encounter [S82.201A, S82.401A]    Procedure(s) (LRB):  INSERTION, INTRAMEDULLARY STEFAN, TIBIA (suprapatellar nail SYNTHES -- regular table -- radiolucent triange -- [place]) (Right)  ORIF, FRACTURE, FIBULA (Right)    Anesthesia: General    Operative Findings: see op note     Estimated Blood Loss: * No values recorded between 8/2/2023  2:11 PM and 8/2/2023  3:28 PM *         Specimens:   Specimen (24h ago, onward)      None              Discharge Note    OUTCOME: Patient tolerated treatment/procedure well without complication and is now ready for discharge.    DISPOSITION: Home or Self Care    FINAL DIAGNOSIS:  <principal problem not specified>    FOLLOWUP: In clinic    DISCHARGE INSTRUCTIONS:    Discharge Procedure Orders   Diet general     Leave dressing on - Keep it clean, dry, and intact until clinic visit     Call MD for:  temperature >100.4     Call MD for:  persistent nausea and vomiting     Call MD for:  severe uncontrolled pain     Call MD for:  difficulty breathing, headache or visual disturbances     Call MD for:  redness, tenderness, or signs of infection (pain, swelling, redness, odor or green/yellow discharge around incision site)     Call MD for:  hives     Call MD for:  persistent dizziness or light-headedness     Call MD for:  extreme fatigue     Keep surgical extremity elevated     Ice to affected area     Non weight bearing   Order Comments: Non weight bearing operative extremity

## 2023-08-02 NOTE — OP NOTE
08/02/2023    PREOPERATIVE DIAGNOSIS:      Closed displaced right tibial shaft fracture  Closed nondisplaced right distal fibula fracture    POSTOPERATIVE DIAGNOSIS: Same    PROCEDURE:      Intramedullary nailing right tibia  Examination under anesthesia right ankle  Nonoperative treatment and closed reduction and splinting right distal fibula fracture      SURGEON: Shorty Chino MD    ASSISTANT: Luis Patel _ CFA    He was present and scrubbed for the entirety of the procedure. They were required for patient positioning, retraction, insertion of implants and closure. Without him I would have been unable to safely perform the case due to only one scrub tech available.     ANESTHESIA:  General/regional     ESTIMATED BLOOD LOSS:  100 mL    INDICATIONS:  Michael Diaz is a 22 y.o. year-old male who presented to my clinic with a chief complaint of right ankle pain and deformity. Imaging revealed a distal tibial fracture and fibula fracture.  We took him urgently the following day for surgical intervention including intramedullary nailing of the right tibia and possible intervention with ORIF of the fibula.      We discussed the risks and benefits of the surgery in detail including stiffness, CRPS, damage to surrounding neurovascular structures, failure of fixation as well as need for revision surgery.  We also discussed risk of compartment syndrome.  Despite these risks they elected to proceed.       COMPONENTS USED:      Implant Name Type Inv. Item Serial No.  Lot No. LRB No. Used Action   STEFAN REAM W/BL TP 2.5MM D 950 - [id number]  STEFAN REAM W/BL TP 2.5MM D 950  [employer] [id number] Right 1 Implanted and Explanted   10MM TI CANNULATED TIBIAL NAIL- EX/375MM    "[employer] [id number] Right 1 Implanted   WIRE GUIDE 3.2MM 400MM - [id number]  WIRE GUIDE 3.2MM 400MM  [employer]  Right 1 Implanted and Explanted   SCREW LOCK T25 5X30MM - [id number]  SCREW LOCK T25 5X30MM  [employer]  Right 1 Implanted   SCREW STRDRV  REC T25 5X32 TTNM - [id number]  SCREW STRDRV REC T25 5X32 TTNM  SYNTHES  Right 1 Implanted   SCREW LOCKING 40MM - [id number]  SCREW LOCKING 40MM  SYNTHES  Right 2 Implanted   SCREW STRDRV REC T25 5X44 TTNM - [id number]  SCREW STRDRV REC T25 5X44 TTNM  SYNTHES  Right 1 Implanted   SCREW STRDRV REC T25 5X44 TTNM - [id number]  SCREW STRDRV REC T25 5X44 TTNM  SYNTHES  Right 1 Implanted and Explanted         DESCRIPTION OF PROCEDURE:  The patient was seen in the pre-operative area where consents were verified and the surgical site marked. They did receive a preoperative block for intra-operative and postoperative analgesia. The patient was taken to the Operating Room where anesthesia was administered by the Anesthesia Department. He was then placed in the supine  position and all superficial neurovascular structures were well padded.  The right leg  was then sterilely prepped and draped in the normal fashion.  Preoperative antibiotics were administered.  A time-out was performed verifying the procedure and laterality, all agreed and elected to proceed as planned.    We made a 5 cm longitudinal incision over the superior border of the patella.  Dissection was carried down to the quadriceps tendon and the quadriceps tendon was split longitudinally in line with the incision.  The knee was extended and a cannula was placed at the tibial verge through the patellofemoral joint using a guide.  This was checked on AP and lateral view and we were satisfied with our overall starting point on both views.  We then placed a guide pin and checked this on AP and lateral view to verify we were in line with the tibial shaft.  We then over-reamed this with an opening entry Reamer.  A curved tip guidewire was then placed on the shaft.  We evaluated the fracture on x-ray and this was a spiral distal 3rd tibial fracture.  We did elect to clamp this from lateral to medial using a point-to-point clamp.  Stab incisions were made and excellent  reduction was performed using a clamp.  This held our reduction nicely while we passed multiple reamers through the bone.  We reamed up to a 11.5 mm Reamer with excellent chatter and cortical fit.  We measured our length to be 375 mm.  We then placed the tibial nail, Synthes, over the reaming makayla and placed this in the appropriate position at the physeal scar distally.  We checked this proximally to make sure that we were not intra-articular and that the tip of the nail was buried.  We then removed the reaming makayla.  The proximal interlocking guide was then placed and 2 lateral to medial proximal interlocking screws were placed through the guide in a percutaneous fashion.  Distally we used perfect [place] technique to place 2 additional medial to lateral and 1 anterior to posterior interlocking screw.  X-rays were taken and we were satisfied with our overall length alignment and rotation of our fracture.  The fibula was then examined under anesthesia.  A dorsiflexion external rotation stress test was performed and the fracture was quite stable.  I did not feel that this needed to be surgically fixed especially in the setting of swelling from a tibia fracture.  We will follow this closely with x-rays over the next week and if any interval displacement certainly will consider ORIF at that time.  We then copiously irrigated with normal saline after taking final x-rays.  The patellofemoral joint was copiously irrigated with normal saline.  The quadriceps tendon was repaired using 0 Vicryl suture.  The subcutaneous layer and skin was closed with 3-0 nylon.  He was placed in a well-padded short-leg splint postoperatively.  He overall tolerated procedure well.    Disposition:      Nonweightbearing 6 weeks.  Follow up 1-2 weeks with x-rays of the right tibia and ankle.     Shorty Chino MD

## 2023-08-02 NOTE — PLAN OF CARE
Report to Saida. Patient denies pain, no nausea, block to right ankle, dressing to right ankle dry intact no drainage, resting quietly, ice to dressing, ankle and knee, vs stable, mother present

## 2023-08-02 NOTE — PLAN OF CARE
Pt and family given discharge instructions. Prescriptions brought to bedside via pharmacy. Educated on post anesthesia precautions, dressing care, and when to notify MD. All questions answered. All belongings returned to pt. Transferred to personal vehicle via wheelchair.        1720: Spoke with anesthesia regarding high BP. Cleared for discharge per anesthesia and baseline BP.

## 2023-08-02 NOTE — PATIENT INSTRUCTIONS
Post-operative instructions     Wound Care  Maintain your operative dressing, loosen bandage if swelling of the foot or ankle occurs.  The dressing should be maintained until follow up.   Shower with occlusive dressing over splint.     Activity  You will be non- weight bearing for the first 6-8 weeks unless otherwise instructed. You must use crutches/walker/wheelchair at all times.     Elevate your operative leg to the level of your heart whenever possible. Pillows can be placed under your foot and ankle.     Medications  Do not drive a car or operate heavy machinery while taking narcotics.   You have been prescribed a narcotic (either Norco or Percocet) for pain control. This is to be used for a short time period.   o Take 1-2 tablets every 4-6 hours as needed  o Max of 12 pills per day  o Plan on using for 2-5 days, depending on level of pain.  o Do not take additional Tylenol (Acetaminophen) while taking Percocet.  o Common side effects include nausea, drowsiness and constipation. Take medication with food to decrease side effects.     Ibuprofen (600-800mg) may be taken in between the narcotic medication.   You should take an aspirin (81mg) daily for 2 weeks. This may lower the risk of a blood clot developing after surgery. Should severe calf pain occur or significant swelling of the calf or ankle, please contact us.    You should resume your normal medications for other conditions the day after surgery. You may not drive or operate heavy equipment while on narcotics. It is important not to   drink while taking narcotic medication.     Diet  Resume normal diet as tolerated this evening. We have no specific diet restrictions after surgery, but extensive use of narcotics can lead to constipation. High fiber diets, lots of fluids and muscle activity can prevent this occurrence.    The anesthetic drugs used during surgery may cause nausea for the first 24 hours. If nausea is encountered, drink only clear liquids. The  only solids should be dry crackers or toast. If the nausea and vomiting become severe or you show signs of being dehydrated (lack of urination), please call.        EMERGENCIES  Contact Dr. Chino or his nurse if any of the following are present:     Difficulty breathing  Painful swelling or numbness     Unrelenting pain  Fever (over 101° - it is normal to have a low grade fever for the first day or two following surgery) or chills   Redness around incisions   Continuous drainage or bleeding from incision (a small amount of drainage is expected)   Excessive nausea/vomiting      **If you have an emergency that requires immediate attention, proceed to the nearest emergency room or call 911.

## 2023-08-02 NOTE — TRANSFER OF CARE
Anesthesia Transfer of Care Note    Patient: Michael Diaz    Procedure(s) Performed: Procedure(s) (LRB):  INSERTION, INTRAMEDULLARY STEFAN, TIBIA (suprapatellar nail SYNTHES -- regular table -- radiolucent triange -- [place]) (Right)  ORIF, FRACTURE, FIBULA (Right)    Patient location: PACU    Anesthesia Type: general    Transport from OR: Transported from OR on 2-3 L/min O2 by NC with adequate spontaneous ventilation    Post pain: adequate analgesia    Post assessment: no apparent anesthetic complications    Post vital signs: stable    Level of consciousness: awake    Nausea/Vomiting: no nausea/vomiting    Complications: none    Transfer of care protocol was followed      Last vitals:   Visit Vitals  /81 (BP Location: Right arm, Patient Position: Lying)   Pulse 96   Temp 36.9 °C (98.5 °F) (Oral)   Resp 20   Ht 6' (1.829 m)   Wt 127 kg (280 lb)   SpO2 100%   BMI 37.97 kg/m²

## 2023-08-02 NOTE — ANESTHESIA PREPROCEDURE EVALUATION
08/02/2023  Michael Diaz is a 22 y.o., male.      Pre-op Assessment    I have reviewed the Patient Summary Reports.     I have reviewed the Nursing Notes. I have reviewed the NPO Status.   I have reviewed the Medications.     Review of Systems  Anesthesia Hx:  No problems with previous Anesthesia    Social:  Former Smoker    Hematology/Oncology:  Hematology Normal   Oncology Normal     EENT/Dental:EENT/Dental Normal   Cardiovascular:  Cardiovascular Normal     Pulmonary:  Pulmonary Normal    Renal/:  Renal/ Normal     Hepatic/GI:  Hepatic/GI Normal    Musculoskeletal:  Musculoskeletal Normal    Neurological:  Neurology Normal    Endocrine:  Obesity / BMI > 30  Dermatological:  Skin Normal    Psych:   Psychiatric History          Physical Exam  General: Well nourished, Cooperative, Alert and Oriented    Airway:  Mallampati: II   Mouth Opening: Normal  TM Distance: Normal  Tongue: Normal  Neck ROM: Normal ROM    Dental:  Intact    Chest/Lungs:  Normal Respiratory Rate    Heart:  Rate: Normal        Anesthesia Plan  Type of Anesthesia, risks & benefits discussed:    Anesthesia Type: Gen ETT, Gen Supraglottic Airway  Intra-op Monitoring Plan: Standard ASA Monitors  Post Op Pain Control Plan: multimodal analgesia, IV/PO Opioids PRN and peripheral nerve block  Induction:  IV  Airway Plan: Direct and Video, Post-Induction  Informed Consent: Informed consent signed with the Patient and all parties understand the risks and agree with anesthesia plan.  All questions answered.   ASA Score: 2    Ready For Surgery From Anesthesia Perspective.     .

## 2023-08-02 NOTE — ANESTHESIA PROCEDURE NOTES
Peripheral Block    Patient location during procedure: pre-op   Block not for primary anesthetic.  Reason for block: at surgeon's request and post-op pain management   Post-op Pain Location: right leg   Start time: 8/2/2023 12:20 PM  Timeout: 8/2/2023 12:20 PM   End time: 8/2/2023 12:25 PM    Staffing  Authorizing Provider: Travis Lujan MD  Performing Provider: Travis Lujan MD    Staffing  Performed by: Travis Lujan MD  Authorized by: Travis Lujan MD    Preanesthetic Checklist  Completed: patient identified, IV checked, site marked, risks and benefits discussed, surgical consent, monitors and equipment checked, pre-op evaluation and timeout performed  Peripheral Block  Patient position: supine  Prep: ChloraPrep  Patient monitoring: heart rate, cardiac monitor, continuous pulse ox, continuous capnometry and frequent blood pressure checks  Block type: popliteal  Laterality: right  Injection technique: single shot  Needle  Needle type: Stimuplex   Needle gauge: 21 G  Needle length: 4 in  Needle localization: anatomical landmarks and ultrasound guidance   -ultrasound image captured on disc.  Assessment  Injection assessment: negative aspiration, negative parasthesia and local visualized surrounding nerve  Paresthesia pain: none  Heart rate change: no  Slow fractionated injection: yes  Pain Tolerance: comfortable throughout block and no complaints  Medications:    Medications: bupivacaine (pf) (MARCAINE) injection 0.5% - Perineural   10 mL - 8/2/2023 12:25:00 PM  BUPivacaine liposome (PF) 1.3 % (13.3 mg/mL) suspension - Injection   15 mL - 8/2/2023 12:25:00 PM    Additional Notes  VSS.  [facility] RN monitoring vitals throughout procedure.  Patient tolerated procedure well.

## 2023-08-03 VITALS
DIASTOLIC BLOOD PRESSURE: 100 MMHG | SYSTOLIC BLOOD PRESSURE: 167 MMHG | TEMPERATURE: 98 F | WEIGHT: 280 LBS | OXYGEN SATURATION: 97 % | HEART RATE: 90 BPM | RESPIRATION RATE: 18 BRPM | BODY MASS INDEX: 37.93 KG/M2 | HEIGHT: 72 IN

## 2023-08-03 NOTE — PROGRESS NOTES
Very pleased with care given.  All staff were very supportive and kind, professional.  He states he understands all discharge instructions.

## 2023-08-08 ENCOUNTER — TELEPHONE (OUTPATIENT)
Dept: PHARMACY | Facility: CLINIC | Age: 23
End: 2023-08-08
Payer: COMMERCIAL

## 2023-08-14 DIAGNOSIS — S82.401A CLOSED FRACTURE OF RIGHT TIBIA AND FIBULA, INITIAL ENCOUNTER: Primary | ICD-10-CM

## 2023-08-14 DIAGNOSIS — S82.201A CLOSED FRACTURE OF RIGHT TIBIA AND FIBULA, INITIAL ENCOUNTER: Primary | ICD-10-CM

## 2023-08-15 ENCOUNTER — HOSPITAL ENCOUNTER (OUTPATIENT)
Dept: RADIOLOGY | Facility: HOSPITAL | Age: 23
Discharge: HOME | End: 2023-08-15
Attending: ORTHOPAEDIC SURGERY
Payer: COMMERCIAL

## 2023-08-15 ENCOUNTER — OFFICE VISIT (OUTPATIENT)
Dept: ORTHOPEDICS | Facility: CLINIC | Age: 23
End: 2023-08-15
Payer: COMMERCIAL

## 2023-08-15 VITALS — BODY MASS INDEX: 37.93 KG/M2 | WEIGHT: 280 LBS | RESPIRATION RATE: 18 BRPM | HEIGHT: 72 IN

## 2023-08-15 DIAGNOSIS — S82.401A CLOSED FRACTURE OF RIGHT TIBIA AND FIBULA, INITIAL ENCOUNTER: Primary | ICD-10-CM

## 2023-08-15 DIAGNOSIS — S82.201A CLOSED FRACTURE OF RIGHT TIBIA AND FIBULA, INITIAL ENCOUNTER: Primary | ICD-10-CM

## 2023-08-15 DIAGNOSIS — S82.401A CLOSED FRACTURE OF RIGHT TIBIA AND FIBULA, INITIAL ENCOUNTER: ICD-10-CM

## 2023-08-15 DIAGNOSIS — S82.201A CLOSED FRACTURE OF RIGHT TIBIA AND FIBULA, INITIAL ENCOUNTER: ICD-10-CM

## 2023-08-15 PROCEDURE — 99024 POSTOP FOLLOW-UP VISIT: CPT | Mod: S$GLB,,, | Performed by: ORTHOPAEDIC SURGERY

## 2023-08-15 PROCEDURE — 99999 PR PBB SHADOW E&M-EST. PATIENT-LVL III: ICD-10-PCS | Mod: PBBFAC,,, | Performed by: ORTHOPAEDIC SURGERY

## 2023-08-15 PROCEDURE — 73590 X-RAY EXAM OF LOWER LEG: CPT | Mod: TC,PO,RT

## 2023-08-15 PROCEDURE — 99999 PR PBB SHADOW E&M-EST. PATIENT-LVL III: CPT | Mod: PBBFAC,,, | Performed by: ORTHOPAEDIC SURGERY

## 2023-08-15 PROCEDURE — 99024 PR POST-OP FOLLOW-UP VISIT: ICD-10-PCS | Mod: S$GLB,,, | Performed by: ORTHOPAEDIC SURGERY

## 2023-08-15 PROCEDURE — 73590 X-RAY EXAM OF LOWER LEG: CPT | Mod: 26,RT,, | Performed by: RADIOLOGY

## 2023-08-15 PROCEDURE — 73590 XR TIBIA FIBULA 2 VIEW RIGHT: ICD-10-PCS | Mod: 26,RT,, | Performed by: RADIOLOGY

## 2023-08-15 RX ORDER — OXYCODONE AND ACETAMINOPHEN 5; 325 MG/1; MG/1
1 TABLET ORAL EVERY 6 HOURS PRN
Qty: 28 TABLET | Refills: 0 | Status: SHIPPED | OUTPATIENT
Start: 2023-08-15 | End: 2023-09-12 | Stop reason: SDUPTHER

## 2023-08-15 RX ORDER — CYCLOBENZAPRINE HCL 10 MG
10 TABLET ORAL 3 TIMES DAILY PRN
Qty: 30 TABLET | Refills: 0 | Status: SHIPPED | OUTPATIENT
Start: 2023-08-15 | End: 2023-08-25

## 2023-08-15 NOTE — PROGRESS NOTES
Post-op Note    HPI    Michael Diaz is here 2 weeks s/p the following procedure:     Open reduction internal fixation right distal tibia with intramedullary nail  Nonoperative treatment of a right distal fibula fracture    Overall doing well. Pain controlled on current regimen.  Compliant with toe-touch weight-bearing.  In the splint currently.  No fevers or chills.    Physical Exam:     Patient is alert and oriented no acute distress.   Assistive Device:  Crutches    Right knee and leg Incision(s) are well healed.  There is no evidence of dehiscence.  There is no induration erythema or signs of infection.  Appropriate soft tissue swelling.  Compartments are soft and compressible.  Warm well-perfused extremity.  Diffuse swelling.  Negative Mallory.    Imaging:     I have personally reviewed the following imaging and these are an interpretation of my findings:     X-Ray: I have reviewed all pertinent results/findings and my personal findings are:  Well-aligned right distal tibia fracture with intramedullary nail.  No significant interval displacement of the distal fibula fracture.    Assessment    Michael Diaz is 2 weeks Post-op     Plan:    Overall doing as expected.  We discussed expectations of surgery and postoperative course.     Pain: Continued postoperative pain regimen -- refill Flexeril and pain medication  DVT prophylaxis:  Aspirin  Nonweightbearing right lower extremity.  Placed in a tall boot today.  Consider advancing weight-bearing next visit    Follow-up: 4 weeks   X-rays next visit:  Right ankle and tibia

## 2023-09-12 DIAGNOSIS — S82.201A CLOSED FRACTURE OF RIGHT TIBIA AND FIBULA, INITIAL ENCOUNTER: ICD-10-CM

## 2023-09-12 DIAGNOSIS — S82.401A CLOSED FRACTURE OF RIGHT TIBIA AND FIBULA, INITIAL ENCOUNTER: ICD-10-CM

## 2023-09-13 RX ORDER — IBUPROFEN 600 MG/1
600 TABLET ORAL EVERY 6 HOURS PRN
Qty: 90 TABLET | Refills: 0 | Status: SHIPPED | OUTPATIENT
Start: 2023-09-13

## 2023-09-13 RX ORDER — OXYCODONE AND ACETAMINOPHEN 5; 325 MG/1; MG/1
1 TABLET ORAL EVERY 6 HOURS PRN
Qty: 28 TABLET | Refills: 0 | Status: SHIPPED | OUTPATIENT
Start: 2023-09-13

## 2023-09-27 ENCOUNTER — TELEPHONE (OUTPATIENT)
Dept: ORTHOPEDICS | Facility: CLINIC | Age: 23
End: 2023-09-27
Payer: COMMERCIAL

## 2023-09-27 DIAGNOSIS — S82.201A CLOSED FRACTURE OF RIGHT TIBIA AND FIBULA, INITIAL ENCOUNTER: Primary | ICD-10-CM

## 2023-09-27 DIAGNOSIS — S82.401A CLOSED FRACTURE OF RIGHT TIBIA AND FIBULA, INITIAL ENCOUNTER: Primary | ICD-10-CM

## 2023-09-27 NOTE — TELEPHONE ENCOUNTER
Spoke with pt. Pt notified they will need xrays prior to appt. Images ordered and scheduled. All questions answered. Pt verbalized understanding.

## 2023-10-02 ENCOUNTER — OFFICE VISIT (OUTPATIENT)
Dept: ORTHOPEDICS | Facility: CLINIC | Age: 23
End: 2023-10-02
Payer: COMMERCIAL

## 2023-10-02 VITALS
DIASTOLIC BLOOD PRESSURE: 79 MMHG | SYSTOLIC BLOOD PRESSURE: 135 MMHG | BODY MASS INDEX: 37.93 KG/M2 | WEIGHT: 280 LBS | HEART RATE: 127 BPM | HEIGHT: 72 IN

## 2023-10-02 DIAGNOSIS — S82.201A CLOSED FRACTURE OF RIGHT TIBIA AND FIBULA, INITIAL ENCOUNTER: Primary | ICD-10-CM

## 2023-10-02 DIAGNOSIS — S82.401A CLOSED FRACTURE OF RIGHT TIBIA AND FIBULA, INITIAL ENCOUNTER: Primary | ICD-10-CM

## 2023-10-02 PROCEDURE — 99999 PR PBB SHADOW E&M-EST. PATIENT-LVL IV: ICD-10-PCS | Mod: PBBFAC,,, | Performed by: ORTHOPAEDIC SURGERY

## 2023-10-02 PROCEDURE — 99024 PR POST-OP FOLLOW-UP VISIT: ICD-10-PCS | Mod: S$GLB,,, | Performed by: ORTHOPAEDIC SURGERY

## 2023-10-02 PROCEDURE — 99999 PR PBB SHADOW E&M-EST. PATIENT-LVL IV: CPT | Mod: PBBFAC,,, | Performed by: ORTHOPAEDIC SURGERY

## 2023-10-02 PROCEDURE — 99024 POSTOP FOLLOW-UP VISIT: CPT | Mod: S$GLB,,, | Performed by: ORTHOPAEDIC SURGERY

## 2023-10-02 RX ORDER — ESCITALOPRAM OXALATE 20 MG/1
20 TABLET ORAL
COMMUNITY
Start: 2023-09-04

## 2023-10-02 NOTE — PROGRESS NOTES
Post-op Note    HPI    Michael Diaz is here 2 months s/p the following procedure:     Open reduction internal fixation right distal tibia with intramedullary nail  Nonoperative treatment of a right distal fibula fracture    Overall doing well.  No significant pain.  Doing home exercise program.  Walking with crutches and putting 70% weight using the boot.  No pain at rest.    Physical Exam:     Patient is alert and oriented no acute distress.   Assistive Device:  Crutches    Right knee and leg Incision(s) are well healed.  There is no evidence of dehiscence.  There is no induration erythema or signs of infection.  Appropriate soft tissue swelling.  Compartments are soft and compressible.  Warm well-perfused extremity.  Diffuse swelling.  Negative Mallory.  Ankle plantar flexion and dorsiflexion 25°.  No pain with dorsiflexion external rotation.  No pain over the fibula    Imaging:     I have personally reviewed the following imaging and these are an interpretation of my findings:     X-Ray: I have reviewed all pertinent results/findings and my personal findings are:  Well-aligned right distal tibia fracture with intramedullary nail.  No significant interval displacement of the distal fibula fracture.    Assessment    Michael Diaz is 2 months Post-op     Plan:    Overall doing as expected.  We discussed expectations of surgery and postoperative course.     Pain: Continued postoperative pain regimen -- Tylenol/ibuprofen  Discussed physical therapy for range of motion and strengthening.  Weightbearing as tolerated.  Wean out of the boot as tolerated.  Follow up 2 months with x-rays

## 2023-10-31 ENCOUNTER — PATIENT MESSAGE (OUTPATIENT)
Dept: ORTHOPEDICS | Facility: CLINIC | Age: 23
End: 2023-10-31
Payer: COMMERCIAL

## 2023-12-04 ENCOUNTER — OFFICE VISIT (OUTPATIENT)
Dept: ORTHOPEDICS | Facility: CLINIC | Age: 23
End: 2023-12-04
Payer: COMMERCIAL

## 2023-12-04 VITALS
SYSTOLIC BLOOD PRESSURE: 126 MMHG | HEIGHT: 72 IN | DIASTOLIC BLOOD PRESSURE: 88 MMHG | WEIGHT: 299.69 LBS | BODY MASS INDEX: 40.59 KG/M2 | RESPIRATION RATE: 18 BRPM

## 2023-12-04 DIAGNOSIS — S82.401A CLOSED FRACTURE OF RIGHT TIBIA AND FIBULA, INITIAL ENCOUNTER: Primary | ICD-10-CM

## 2023-12-04 DIAGNOSIS — S82.201A CLOSED FRACTURE OF RIGHT TIBIA AND FIBULA, INITIAL ENCOUNTER: Primary | ICD-10-CM

## 2023-12-04 PROCEDURE — 99212 PR OFFICE/OUTPT VISIT, EST, LEVL II, 10-19 MIN: ICD-10-PCS | Mod: S$GLB,,, | Performed by: ORTHOPAEDIC SURGERY

## 2023-12-04 PROCEDURE — 99999 PR PBB SHADOW E&M-EST. PATIENT-LVL III: CPT | Mod: PBBFAC,,, | Performed by: ORTHOPAEDIC SURGERY

## 2023-12-04 PROCEDURE — 99999 PR PBB SHADOW E&M-EST. PATIENT-LVL III: ICD-10-PCS | Mod: PBBFAC,,, | Performed by: ORTHOPAEDIC SURGERY

## 2023-12-04 PROCEDURE — 99212 OFFICE O/P EST SF 10 MIN: CPT | Mod: S$GLB,,, | Performed by: ORTHOPAEDIC SURGERY

## 2023-12-04 NOTE — PROGRESS NOTES
Post-op Note    HPI    Michael Diaz is here 4 months s/p the following procedure:     Open reduction internal fixation right distal tibia with intramedullary nail  Nonoperative treatment of a right distal fibula fracture    Overall doing very well.  No pain today.  Ambulating without difficulty.  Feels that he is getting stronger every day.  Radial back to work.    Physical Exam:     Patient is alert and oriented no acute distress.   Assistive Device:  None    Right knee and leg Incision(s) are well healed.  There is no evidence of dehiscence.  There is no induration erythema or signs of infection.  No significant soft tissue swelling.  Compartments are soft and compressible.  Warm well-perfused extremity.  Diffuse swelling.  Negative Mallory.  Ankle plantar flexion and dorsiflexion 25°.  No pain with dorsiflexion external rotation.  No pain over the fibula    Imaging:     I have personally reviewed the following imaging and these are an interpretation of my findings:     X-Ray: I have reviewed all pertinent results/findings and my personal findings are:  Well-aligned right distal tibia fracture with intramedullary nail.  No significant interval displacement of the distal fibula fracture. Appropriate healing.  70% healed.    Assessment    Michael Diaz is 4 months Post-op     Plan:    Overall doing well.  He is ambulating well and has no pain.  X-rays show appropriate healing.  He would like to turned to return to work.  I will let him go back to work without restriction except for climbing ladders etc..

## 2024-01-03 ENCOUNTER — TELEPHONE (OUTPATIENT)
Dept: ORTHOPEDICS | Facility: CLINIC | Age: 24
End: 2024-01-03
Payer: COMMERCIAL

## 2024-01-03 ENCOUNTER — PATIENT MESSAGE (OUTPATIENT)
Dept: ORTHOPEDICS | Facility: CLINIC | Age: 24
End: 2024-01-03
Payer: COMMERCIAL

## 2024-01-30 ENCOUNTER — TELEPHONE (OUTPATIENT)
Dept: URGENT CARE | Facility: CLINIC | Age: 24
End: 2024-01-30
Payer: COMMERCIAL

## 2024-01-30 NOTE — TELEPHONE ENCOUNTER
Patient called and states that he made a mistake and canceled his appointment by mistake. He asked if he could reschedule that appointment for him and I was able to.[id number]

## 2024-01-31 ENCOUNTER — OCCUPATIONAL HEALTH (OUTPATIENT)
Dept: URGENT CARE | Facility: CLINIC | Age: 24
End: 2024-01-31

## 2024-01-31 VITALS — WEIGHT: 293 LBS | BODY MASS INDEX: 43.4 KG/M2 | HEIGHT: 69 IN

## 2024-01-31 DIAGNOSIS — Z00.00 ENCOUNTER FOR PHYSICAL EXAMINATION: Primary | ICD-10-CM

## 2024-01-31 LAB
CTP QC/QA: YES
POC 5 PANEL DRUG SCREEN: NEGATIVE

## 2024-01-31 PROCEDURE — 99080 SPECIAL REPORTS OR FORMS: CPT | Mod: S$GLB,,, | Performed by: SURGERY

## 2024-01-31 PROCEDURE — 99002 DEVICE HANDLING PHYS/QHP: CPT | Mod: S$GLB,,, | Performed by: SURGERY

## 2024-01-31 PROCEDURE — 94010 BREATHING CAPACITY TEST: CPT | Mod: S$GLB,,, | Performed by: SURGERY

## 2024-01-31 PROCEDURE — 99499 UNLISTED E&M SERVICE: CPT | Mod: S$GLB,,, | Performed by: SURGERY

## 2024-01-31 PROCEDURE — 80305 DRUG TEST PRSMV DIR OPT OBS: CPT | Mod: S$GLB,,, | Performed by: SURGERY

## 2025-02-28 ENCOUNTER — OCCUPATIONAL HEALTH (OUTPATIENT)
Dept: URGENT CARE | Facility: CLINIC | Age: 25
End: 2025-02-28

## 2025-02-28 DIAGNOSIS — Z00.00 ENCOUNTER FOR PHYSICAL EXAMINATION: Primary | ICD-10-CM

## 2025-02-28 LAB
BREATH ALCOHOL: 0
CTP QC/QA: YES
POC 5 PANEL DRUG SCREEN: NEGATIVE

## (undated) DEVICE — GOWN POLY REINF BRTH SLV LG

## (undated) DEVICE — SOL NACL IRR 1000ML BTL

## (undated) DEVICE — WIRE GUIDE 3.2MM 400MM
Type: IMPLANTABLE DEVICE | Site: TIBIA | Status: NON-FUNCTIONAL
Removed: 2023-08-02

## (undated) DEVICE — UNDERGLOVES BIOGEL PI SIZE 8

## (undated) DEVICE — BLADE SURG #15 CARBON STEEL

## (undated) DEVICE — PADDING WYTEX UNDRCST 6INX4YD

## (undated) DEVICE — BANDAGE ESMARK ELASTIC ST 6X9

## (undated) DEVICE — DRAPE U SPLIT SHEET 54X76IN

## (undated) DEVICE — DRAPE EXTREMITY ORTHOMAX

## (undated) DEVICE — SUT CTD VICRYL 0 UND BR

## (undated) DEVICE — PACK CUSTOM UNIV BASIN SLI

## (undated) DEVICE — APPLICATOR CHLORAPREP ORN 26ML

## (undated) DEVICE — SLEEVE OTR PROTCT STRL 12MM

## (undated) DEVICE — DRAPE STERI U-SHAPED 47X51IN

## (undated) DEVICE — TOWEL OR DISP STRL BLUE 4/PK

## (undated) DEVICE — PAD CAST SPECIALIST STRL 6

## (undated) DEVICE — GOWN POLY REINF BRTH SLV XL

## (undated) DEVICE — LINER SUCTION 3000CC

## (undated) DEVICE — SUT 2-0 VICRYL / CT-1

## (undated) DEVICE — BANDAGE MATRIX HK LOOP 6IN 5YD

## (undated) DEVICE — SUT X424H ETHIBOND 0-0

## (undated) DEVICE — BIT DRILL QCK-CPLG 4.2MM

## (undated) DEVICE — 12MM DRILL BIT

## (undated) DEVICE — BIT DRILL 4.2MM 3 FLUTD 145MM

## (undated) DEVICE — ALCOHOL 70% ANTISEPTIC ISO 4OZ

## (undated) DEVICE — SUT ETHILON 3-0 FS-1 30

## (undated) DEVICE — STRAP OR TABLE 5IN X 72IN

## (undated) DEVICE — BLADE SURG CARBON STEEL #10

## (undated) DEVICE — IMPLANTABLE DEVICE
Type: IMPLANTABLE DEVICE | Site: LEG | Status: NON-FUNCTIONAL
Removed: 2023-08-02

## (undated) DEVICE — DRESSING XEROFORM FOIL PK 1X8

## (undated) DEVICE — SUT 2/0 18IN COATED VICRYL

## (undated) DEVICE — DRAPE C ARM 42 X 120 10/BX

## (undated) DEVICE — GLOVE BIOGEL ORTHOPEDIC 7.5

## (undated) DEVICE — BNDG COFLEX FOAM LF2 ST 6X5YD

## (undated) DEVICE — PACK SIRUS BASIC V SURG STRL

## (undated) DEVICE — DRAPE C-ARMOR EQUIPMENT COVER

## (undated) DEVICE — SOCKINETTE IMPERVIOUS 12X48IN

## (undated) DEVICE — TIP YANKAUERS BULB NO VENT

## (undated) DEVICE — SLEEVE SCD EXPRESS KNEE MEDIUM

## (undated) DEVICE — SPONGE BULKEE II ABSRB 6X6.75